# Patient Record
Sex: FEMALE | Race: WHITE
[De-identification: names, ages, dates, MRNs, and addresses within clinical notes are randomized per-mention and may not be internally consistent; named-entity substitution may affect disease eponyms.]

---

## 2018-01-29 ENCOUNTER — HOSPITAL ENCOUNTER (INPATIENT)
Dept: HOSPITAL 96 - M.ERS | Age: 43
LOS: 7 days | Discharge: HOME | DRG: 441 | End: 2018-02-05
Attending: INTERNAL MEDICINE | Admitting: INTERNAL MEDICINE
Payer: COMMERCIAL

## 2018-01-29 VITALS — HEIGHT: 62.99 IN | BODY MASS INDEX: 28.17 KG/M2 | WEIGHT: 159 LBS

## 2018-01-29 VITALS — SYSTOLIC BLOOD PRESSURE: 108 MMHG | DIASTOLIC BLOOD PRESSURE: 62 MMHG

## 2018-01-29 VITALS — DIASTOLIC BLOOD PRESSURE: 92 MMHG | SYSTOLIC BLOOD PRESSURE: 163 MMHG

## 2018-01-29 VITALS — DIASTOLIC BLOOD PRESSURE: 65 MMHG | SYSTOLIC BLOOD PRESSURE: 98 MMHG

## 2018-01-29 VITALS — DIASTOLIC BLOOD PRESSURE: 65 MMHG | SYSTOLIC BLOOD PRESSURE: 112 MMHG

## 2018-01-29 DIAGNOSIS — N39.0: ICD-10-CM

## 2018-01-29 DIAGNOSIS — K72.90: Primary | ICD-10-CM

## 2018-01-29 DIAGNOSIS — E87.6: ICD-10-CM

## 2018-01-29 DIAGNOSIS — R00.0: ICD-10-CM

## 2018-01-29 DIAGNOSIS — E83.42: ICD-10-CM

## 2018-01-29 DIAGNOSIS — Z90.49: ICD-10-CM

## 2018-01-29 DIAGNOSIS — K44.9: ICD-10-CM

## 2018-01-29 DIAGNOSIS — I85.11: ICD-10-CM

## 2018-01-29 DIAGNOSIS — K76.6: ICD-10-CM

## 2018-01-29 DIAGNOSIS — I95.9: ICD-10-CM

## 2018-01-29 DIAGNOSIS — D69.6: ICD-10-CM

## 2018-01-29 DIAGNOSIS — Z79.899: ICD-10-CM

## 2018-01-29 DIAGNOSIS — E44.0: ICD-10-CM

## 2018-01-29 DIAGNOSIS — I10: ICD-10-CM

## 2018-01-29 DIAGNOSIS — K31.89: ICD-10-CM

## 2018-01-29 DIAGNOSIS — E86.0: ICD-10-CM

## 2018-01-29 DIAGNOSIS — K70.30: ICD-10-CM

## 2018-01-29 DIAGNOSIS — I86.4: ICD-10-CM

## 2018-01-29 DIAGNOSIS — F41.9: ICD-10-CM

## 2018-01-29 DIAGNOSIS — R65.10: ICD-10-CM

## 2018-01-29 DIAGNOSIS — K76.9: ICD-10-CM

## 2018-01-29 DIAGNOSIS — F10.20: ICD-10-CM

## 2018-01-29 DIAGNOSIS — D62: ICD-10-CM

## 2018-01-29 LAB
%HYPO/RBC NFR BLD AUTO: (no result) %
ABSOLUTE BASOPHILS: 0.1 THOU/UL (ref 0–0.2)
ABSOLUTE MONOCYTES: 0.9 THOU/UL (ref 0–1.2)
ALBUMIN SERPL-MCNC: 2.3 G/DL (ref 3.4–5)
ALP SERPL-CCNC: 218 U/L (ref 46–116)
ALT SERPL-CCNC: 27 U/L (ref 30–65)
ANION GAP SERPL CALC-SCNC: 11 MMOL/L (ref 7–16)
AST SERPL-CCNC: 135 U/L (ref 15–37)
BACTERIA-REFLEX: (no result) /HPF
BASOPHILS NFR BLD AUTO: 1 %
BILIRUB SERPL-MCNC: 6.5 MG/DL
BILIRUB UR-MCNC: (no result) MG/DL
BUN SERPL-MCNC: 12 MG/DL (ref 7–18)
CALCIUM SERPL-MCNC: 7.5 MG/DL (ref 8.5–10.1)
CHLORIDE SERPL-SCNC: 95 MMOL/L (ref 98–107)
CO2 SERPL-SCNC: 29 MMOL/L (ref 21–32)
COLOR UR: (no result)
CREAT SERPL-MCNC: 0.9 MG/DL (ref 0.6–1.3)
GLUCOSE SERPL-MCNC: 121 MG/DL (ref 70–99)
GRANULOCYTES NFR BLD MANUAL: 81 %
HCT VFR BLD CALC: 24.6 % (ref 37–47)
HCT VFR BLD CALC: 28.6 % (ref 37–47)
HGB BLD-MCNC: 8.7 GM/DL (ref 12–15)
HGB BLD-MCNC: 9.9 GM/DL (ref 12–15)
KETONES UR STRIP-MCNC: (no result) MG/DL
LIPASE: 247 U/L (ref 73–393)
LYMPHOCYTES # BLD: 0.9 THOU/UL (ref 0.8–5.3)
LYMPHOCYTES NFR BLD AUTO: 7 %
MACROCYTES: (no result)
MCH RBC QN AUTO: 33.8 PG (ref 26–34)
MCHC RBC AUTO-ENTMCNC: 34.7 G/DL (ref 28–37)
MCV RBC: 97.4 FL (ref 80–100)
MONOCYTES NFR BLD: 8 %
MPV: 11.2 FL. (ref 7.2–11.1)
NEUTROPHILS # BLD: 9.5 THOU/UL (ref 1.6–8.1)
NEUTS BAND NFR BLD: 2 %
NUCLEATED RBCS: 0 /100WBC
PLATELET # BLD EST: ADEQUATE 10*3/UL
PLATELET COUNT*: 140 THOU/UL (ref 150–400)
POTASSIUM SERPL-SCNC: 2.2 MMOL/L (ref 3.5–5.1)
PROT SERPL-MCNC: 7.5 G/DL (ref 6.4–8.2)
PROT UR QL STRIP: (no result)
RBC # BLD AUTO: 2.94 MIL/UL (ref 4.2–5)
RBC # UR STRIP: (no result) /UL
RDW-CV: 16.2 % (ref 10.5–14.5)
SODIUM SERPL-SCNC: 135 MMOL/L (ref 136–145)
SP GR UR STRIP: 1.01 (ref 1–1.03)
SQUAMOUS: (no result) /LPF (ref 0–3)
TARGETS BLD QL SMEAR: (no result)
URINE CLARITY: (no result)
URINE GLUCOSE-RANDOM: NEGATIVE
URINE LEUKOCYTES-REFLEX: (no result)
URINE NITRITE-REFLEX: POSITIVE
UROBILINOGEN UR STRIP-ACNC: 1 E.U./DL (ref 0.2–1)
VARIANT LYMPHS NFR BLD MANUAL: 1 %
WBC # BLD AUTO: 11.4 THOU/UL (ref 4–11)

## 2018-01-29 NOTE — PROC
[address]  [address]                    PROCEDURE REPORT              
_______________________________________________________________________________
 
Name:       LARA RAO             Room:           [place] [address]    ADM IN  
.R.#:  [id number]      Account #:      [id number]  
Admission:  01/29/18     Attend Phys:    Franko Garcia MD 
Discharge:               Date of Birth:  [birth year]  
         Report #: [id number]
                                                                                
_______________________________________________________________________________
THIS REPORT FOR:  //name//                      
 
For details of GI report, please see the Provation report in Perceptive 7
content.
 
 
 
 
 
 
 
 
 
 
 
 
 
 
 
 
 
 
 
 
 
 
 
 
 
 
 
 
 
 
 
 
 
 
 
 
 
 
 
 
                       
                                        By:                                
                 
D: 01/30/18     _______________________________________
T: 01/31/18 1315Medical Records Staff LUIZ       /AL

## 2018-01-29 NOTE — [place] [address]
Phone:  [phone]                     ELECTROCARDIOGRAM REPORT      
_______________________________________________________________________________
 
Name:       LARA RAO             Room:           [place] [address]    ADM IN  
.R.#:  [id number]      Account #:      [id number]  
Admission:  18     Attend Phys:    Franko Garcia MD 
Discharge:               Date of Birth:  [birth year]  
         Report #: [id number]
    [id number]
_______________________________________________________________________________
THIS REPORT FOR:  //name//                      
 
                         [facility] ED
                                       
Test Date:    2018               Test Time:    11:29:54
Pat Name:     LARA RAO         Department:   
Patient ID:   [id number]            Room:         [facility]
Gender:       F                        Technician:   OSWALDO GILMORE
:          1975               Requested By: Debra Noguera
Order Number: [id number] MD:   Cesar Valverde
                                 Measurements
Intervals                              Axis          
Rate:         126                      P:            16
VT:           129                      QRS:          -6
QRSD:         81                       T:            154
QT:           289                                    
QTc:          419                                    
                           Interpretive Statements
Sinus tachycardia
Multiple ventricular premature complexes
Inferior infarct, old
consider old anterior infarction
Lateral leads are also involved
Compared to ECG 2017 09:58:58
Ventricular premature complex(es) now present
Myocardial infarct finding now present
 
 
Electronically Signed On 2018 16:43:09 CST by Cesar Valverde
[email or web address]
 
 
 
 
 
 
 
 
 
 
 
 
 
  <ELECTRONICALLY SIGNED>
                                           By: Cesar Valverde MD, FACC      
  18     1643
D: 18 1129   _____________________________________
T: 18 1129   Cesar Valverde MD, FAC        /EPI

## 2018-01-29 NOTE — NUR
PT TO ROOM FROM ER , , APPEARS ALERT O X 4, DENIES CHEST PAIN, SOB
PAIN OR DISCOMFORRT, c/o BALCK STOOLSD FROM LAST FEW DAYS, STATES
COUGHED UP SOME BLOOD YESTEDAY. ER REPORSTMG LEVEL 0.6, K LEVLS 2.2

## 2018-01-30 VITALS — DIASTOLIC BLOOD PRESSURE: 70 MMHG | SYSTOLIC BLOOD PRESSURE: 114 MMHG

## 2018-01-30 VITALS — SYSTOLIC BLOOD PRESSURE: 107 MMHG | DIASTOLIC BLOOD PRESSURE: 72 MMHG

## 2018-01-30 VITALS — SYSTOLIC BLOOD PRESSURE: 105 MMHG | DIASTOLIC BLOOD PRESSURE: 71 MMHG

## 2018-01-30 VITALS — DIASTOLIC BLOOD PRESSURE: 64 MMHG | SYSTOLIC BLOOD PRESSURE: 97 MMHG

## 2018-01-30 VITALS — SYSTOLIC BLOOD PRESSURE: 90 MMHG | DIASTOLIC BLOOD PRESSURE: 61 MMHG

## 2018-01-30 VITALS — DIASTOLIC BLOOD PRESSURE: 71 MMHG | SYSTOLIC BLOOD PRESSURE: 105 MMHG

## 2018-01-30 LAB
ABSOLUTE BASOPHILS: 0.1 THOU/UL (ref 0–0.2)
ABSOLUTE EOSINOPHILS: 0.1 THOU/UL (ref 0–0.7)
ABSOLUTE MONOCYTES: 1 THOU/UL (ref 0–1.2)
ANION GAP SERPL CALC-SCNC: 9 MMOL/L (ref 7–16)
BASOPHILS NFR BLD AUTO: 1.5 %
BUN SERPL-MCNC: 10 MG/DL (ref 7–18)
CALCIUM SERPL-MCNC: 6.7 MG/DL (ref 8.5–10.1)
CHLORIDE SERPL-SCNC: 104 MMOL/L (ref 98–107)
CO2 SERPL-SCNC: 24 MMOL/L (ref 21–32)
CREAT SERPL-MCNC: 0.6 MG/DL (ref 0.6–1.3)
EOSINOPHIL NFR BLD: 1.1 %
GLUCOSE SERPL-MCNC: 96 MG/DL (ref 70–99)
GRANULOCYTES NFR BLD MANUAL: 74.8 %
HCT VFR BLD CALC: 24.2 % (ref 37–47)
HGB BLD-MCNC: 8.3 GM/DL (ref 12–15)
INR PPP: 1.6
LYMPHOCYTES # BLD: 0.9 THOU/UL (ref 0.8–5.3)
LYMPHOCYTES NFR BLD AUTO: 10.5 %
MCH RBC QN AUTO: 34.8 PG (ref 26–34)
MCHC RBC AUTO-ENTMCNC: 34.3 G/DL (ref 28–37)
MCV RBC: 101.6 FL (ref 80–100)
MONOCYTES NFR BLD: 12.1 %
MPV: 11 FL. (ref 7.2–11.1)
NEUTROPHILS # BLD: 6.4 THOU/UL (ref 1.6–8.1)
NUCLEATED RBCS: 0 /100WBC
PLATELET COUNT*: 99 THOU/UL (ref 150–400)
POTASSIUM SERPL-SCNC: 3.5 MMOL/L (ref 3.5–5.1)
PROTHROMBIN TIME: 15.3 SECONDS (ref 9.2–11.5)
RBC # BLD AUTO: 2.38 MIL/UL (ref 4.2–5)
RDW-CV: 16.5 % (ref 10.5–14.5)
SODIUM SERPL-SCNC: 137 MMOL/L (ref 136–145)
WBC # BLD AUTO: 8.5 THOU/UL (ref 4–11)

## 2018-01-30 NOTE — NUR
ASSESSMENT COMPLETED REFER TO COMPUTER CHARTING. CARDIAC MONITOR TRACKING ST.
PATIENT RESTING IN BED REPORTING NO PAIN, NAUSEA OR SHORTNESS OF BREATH AT
THIS TIME. IV FLUIDS INFUSING. ON ROOM AIR. PATIENT UP SELF IN ROOM. BED IN
LOW AND LOCKED POSITION. CALL LIGHT WITHIN REACH. WILL CONTINUE TO MONITOR
THIS SHIFT.

## 2018-01-30 NOTE — NUR
MET WITH PT TO DISCUSS HOME SITUATION/DC PLANNING. PT LIVES WITH SPOUSE AND
FAMILY.  HAS SEVERAL FAMILY MEMBERS IN ROOM ALSO WHO ARE SUPPORTIVE. PT STATES
SHE IS INDEPENDENT AND ACTIVE.  USES NO EQUIPMENT AND WORKS OUTSIDE THE HOME.
PT TO HAVE GI PROCEDURES TODAY.  DENIES ANY DC NEEDS AT THIS TIME. WILL FOLLOW

## 2018-01-30 NOTE — NUR
PT ALERT ORIENTED. INITAL ASSESSMENT HR 130S. DR MARIEE NOTIFIED NS TIMES
ONE LITER BOLUS GIVEN. MAG 0.6 MAG REPLACEMENT GIVEN. REDRAW CAME BACK AS 3.6.
VITAMIN BAG AT 100ML/HR HELD. DR MARIEE NOTIFIED. NS AT 100ML/HR TIMES ONE
LITER ORDERED. ALSO ORDER TO REMOVE MG FROM VITAMIN BAG. TELEMETRY NOW SHOWING
. PT UP TO BATHROOM TWICE. NPO AT MN. ORAL K INITALLY GIVEN. PT HAD ONE
EPISOID OF BLOODY EMESIS. SM AMT. NOW DOING IV REPLACEMENT. PROTONIX AT
20MG/HR. STAT H&H ORDERED RESULTS 8.7/24.6. WILL CONTINUE TO MONITOR.

## 2018-01-31 VITALS — DIASTOLIC BLOOD PRESSURE: 70 MMHG | SYSTOLIC BLOOD PRESSURE: 103 MMHG

## 2018-01-31 VITALS — SYSTOLIC BLOOD PRESSURE: 105 MMHG | DIASTOLIC BLOOD PRESSURE: 69 MMHG

## 2018-01-31 VITALS — DIASTOLIC BLOOD PRESSURE: 63 MMHG | SYSTOLIC BLOOD PRESSURE: 99 MMHG

## 2018-01-31 VITALS — SYSTOLIC BLOOD PRESSURE: 114 MMHG | DIASTOLIC BLOOD PRESSURE: 74 MMHG

## 2018-01-31 VITALS — SYSTOLIC BLOOD PRESSURE: 96 MMHG | DIASTOLIC BLOOD PRESSURE: 59 MMHG

## 2018-01-31 LAB
ABSOLUTE BASOPHILS: 0.1 THOU/UL (ref 0–0.2)
ABSOLUTE EOSINOPHILS: 0.1 THOU/UL (ref 0–0.7)
ABSOLUTE MONOCYTES: 0.8 THOU/UL (ref 0–1.2)
ALBUMIN SERPL-MCNC: 1.9 G/DL (ref 3.4–5)
ALP SERPL-CCNC: 176 U/L (ref 46–116)
ALT SERPL-CCNC: 36 U/L (ref 30–65)
ANION GAP SERPL CALC-SCNC: 9 MMOL/L (ref 7–16)
APTT BLD: 31.1 SECONDS (ref 25–31.3)
AST SERPL-CCNC: 147 U/L (ref 15–37)
BASOPHILS NFR BLD AUTO: 1 %
BILIRUB SERPL-MCNC: 5.6 MG/DL
BUN SERPL-MCNC: 7 MG/DL (ref 7–18)
CALCIUM SERPL-MCNC: 6.1 MG/DL (ref 8.5–10.1)
CHLORIDE SERPL-SCNC: 103 MMOL/L (ref 98–107)
CO2 SERPL-SCNC: 22 MMOL/L (ref 21–32)
CREAT SERPL-MCNC: 0.6 MG/DL (ref 0.6–1.3)
EOSINOPHIL NFR BLD: 1.1 %
GLUCOSE SERPL-MCNC: 91 MG/DL (ref 70–99)
GRANULOCYTES NFR BLD MANUAL: 82.6 %
HCT VFR BLD CALC: 22 % (ref 37–47)
HCT VFR BLD CALC: 29 % (ref 37–47)
HGB BLD-MCNC: 7.6 GM/DL (ref 12–15)
HGB BLD-MCNC: 9.8 GM/DL (ref 12–15)
INR PPP: 1.6
LYMPHOCYTES # BLD: 0.6 THOU/UL (ref 0.8–5.3)
LYMPHOCYTES NFR BLD AUTO: 6.7 %
MCH RBC QN AUTO: 34.9 PG (ref 26–34)
MCHC RBC AUTO-ENTMCNC: 34.5 G/DL (ref 28–37)
MCV RBC: 101.2 FL (ref 80–100)
MONOCYTES NFR BLD: 8.6 %
MPV: 11.7 FL. (ref 7.2–11.1)
NEUTROPHILS # BLD: 7.5 THOU/UL (ref 1.6–8.1)
NUCLEATED RBCS: 0 /100WBC
PLATELET COUNT*: 107 THOU/UL (ref 150–400)
POTASSIUM SERPL-SCNC: 3.5 MMOL/L (ref 3.5–5.1)
PROT SERPL-MCNC: 6.1 G/DL (ref 6.4–8.2)
PROTHROMBIN TIME: 15.8 SECONDS (ref 9.2–11.5)
RBC # BLD AUTO: 2.17 MIL/UL (ref 4.2–5)
RDW-CV: 16.7 % (ref 10.5–14.5)
SODIUM SERPL-SCNC: 134 MMOL/L (ref 136–145)
WBC # BLD AUTO: 9.1 THOU/UL (ref 4–11)

## 2018-01-31 PROCEDURE — 0FB03ZX EXCISION OF LIVER, PERCUTANEOUS APPROACH, DIAGNOSTIC: ICD-10-PCS | Performed by: RADIOLOGY

## 2018-01-31 PROCEDURE — 30233N1 TRANSFUSION OF NONAUTOLOGOUS RED BLOOD CELLS INTO PERIPHERAL VEIN, PERCUTANEOUS APPROACH: ICD-10-PCS | Performed by: INTERNAL MEDICINE

## 2018-01-31 PROCEDURE — 0DJ08ZZ INSPECTION OF UPPER INTESTINAL TRACT, VIA NATURAL OR ARTIFICIAL OPENING ENDOSCOPIC: ICD-10-PCS | Performed by: INTERNAL MEDICINE

## 2018-01-31 NOTE — CON
[address]  [address]                    CONSULTATION                  
_______________________________________________________________________________
 
Name:       LARA RAO             Room:           [place] [address]    ADM IN  
M.R.#:  [id number]      Account #:      [id number]  
Admission:  01/29/18     Attend Phys:    Franko Garcia MD 
Discharge:               Date of Birth:  [birth year]  
         Report #: [id number]
                                                                     [id number]  
_______________________________________________________________________________
THIS REPORT FOR:  //name//                      
 
CC: Franko Patel DO
 
DICTATED BY: Kerry Padilla [facility]
 
DATE OF SERVICE:  01/30/2018
 
 
Please note at the time of this dictation, the patient was seen and physically
examined by myself.
 
REASON FOR CONSULTATION:  Elevated melanotic stool and hematemesis.
 
HISTORY OF PRESENT ILLNESS:  This is a 42-year-old female who has been
complaining of upper respiratory issues for the last 2 weeks including cough. 
She went to Urgent Care last week and she states she had blood work done and
they told her at that time that her blood work was all normal.  I do not have
any labs to validate this.  She states on Saturday, she had a little bit of dark
stools.  Her stools, she states, on Saturday and Sunday have been diarrhea to
loose a couple of times a day and have been black to dark in nature.  She denies
taking any iron.  She also started on yesterday or Sunday evening having
vomiting some bright red blood, which was concerning since she was already
having some dark stools.  The patient states she has not had anything to drink
as of a couple of weeks ago and she states at that time she had 3-4 hard liquor
type drinks.  She also noticed that she had been having some nosebleeds as well.
 The patient was last seen by us in 12/2016 and underwent an EGD, which showed
grade C esophagitis, hiatal hernia, gastritis, negative for H. pylori and
duodenal biopsies that were negative as well.  She was to have a repeat
endoscopy study; however, she states when she came in, they told her that her
pregnancy test was positive and her procedure was canceled and she never got
that done last year.  She is no longer taking any of her PPIs at this time.  She
denies any ibuprofen or any nonsteroidals at this time as well.  She denies any
abdominal pain or any ongoing nausea at this time.
 
ALLERGIES:  No known drug allergies.
 
MEDICATIONS:  From home include Cipro and Zofran along with lisinopril.
 
PAST MEDICAL HISTORY:  Hypertension, history of alcoholic hepatitis in the past,
some anxiety.
 
PAST SURGICAL HISTORY:  Appendectomy, tonsillectomy, breast augmentation.
 
 
 
 
[place] [address]                    CONSULTATION                  
_______________________________________________________________________________
 
Name:       LARA RAO TEN             Room:           [address] [place]  
[facility]#:  [id number]      Account #:      [id number]  
Admission:  01/29/18     Attend Phys:    Franko Garcia MD 
Discharge:               Date of Birth:  [birth year]  
         Report #: [id number]
                                                                     [id number]  
_______________________________________________________________________________
FAMILY HISTORY:  Noncontributory.
 
SOCIAL HISTORY:  Tobacco use, none.  No illegal drug use.  Alcohol use, she
continues at least on a weekly basis, but has not had any in the last couple of
weeks because of her upper respiratory symptoms.  A 12-point review of systems
is essentially negative except what is mentioned in the HPI.
 
PHYSICAL EXAMINATION:
VITAL SIGNS:  Temperature 36.9, pulse 106, respirations 20, blood pressure
97/64.
HEART:  Regular rate and rhythm.
LUNGS:  Clear.
ABDOMEN:  Soft, positive bowel sounds in all 4 quadrants with no masses or
tenderness noted.
 
LABORATORY DATA:  Hemoglobin on admission was 9.9; back in 09/2017, she was
noted to be 13.9; hemoglobin this morning is 8.3.  Hematocrit 24.2.  White count
on admission was 11.4, she is down to 8.5.  Platelets were 140 and she is down
to 99.  Sodium 137, potassium 3.5, chloride 104, CO2 24, BUN is 10, creatinine
is 0.6, GFR is 109, glucose is 96.  She did have some positive nitrites in her
urine and potassium and magnesium were low on admission and have been since
corrected.  _____, alk phos 218, ALT 27, , _____ and albumin is 2.3.  CT
of the abdomen and pelvis showed hepatomegaly with extensive, tiny, well-defined
liver lesions scattered throughout.  She had a thickened stricture at the tip of
the proximal colon, which may represent a contracted cecum with the appendix
still in place and her gallbladder is present.  Also, noted some steatosis and
bile ducts are normal.
NEUROLOGIC:  Spleen is normal with some multiple calcified granulomas present.
 
IMPRESSION:
1.  Hematemesis.
2.  Melanotic stool.
3.  Acute anemia.
4.  Elevated liver function tests.
5.  Liver lesions.
6.  History of grade C esophagitis and gastritis.
7.  History of alcohol abuse.
8.  Thrombocytopenia.
 
PLAN:
1.  EGD today with Dr. Oleary.
2.  Liver biopsy at 1300.
3.  AFP, PT and INR today.  CMP and CBC in a.m.
4.  Further recommendations to be made once the above have all been noted.
 
Thank you for allowing us to participate in this patient's care.  Please do not
 
 
 
[place] [address]                    CONSULTATION                  
_______________________________________________________________________________
 
Name:       LARA RAO             Room:           227[address]1    [facility] [place]  
.TEN.#:  [id number]      Account #:      [id number]  
Admission:  01/29/18     Attend Phys:    Franko Garcia MD 
Discharge:               Date of Birth:  [birth year]  
         Report #: [id number]
                                                                     [id number]  
_______________________________________________________________________________
hesitate to call with any questions in regard to this consult.
 
The patient with history of alcohol abuse and grade C esophagitis back in
12/2016, who reports that she takes Prevacid on as-needed basis.  She reports
that she had nausea, vomiting, hematemesis and also melanotic stool.  Her stool
today has been clearing and turning brown.  She denies any further nausea and
vomiting.  She also denies any abdominal pain.  She appeared jaundiced and her
bilirubin is above 6.  She is scheduled for liver biopsy later.  We will go
ahead and perform an upper endoscopy to further evaluate her GI bleed.  We will
postpone her liver biopsy to tomorrow.
 
 
 
 
 
 
 
 
 
 
 
 
 
 
 
 
 
 
 
 
 
 
 
 
 
 
 
 
 
 
 
 
 
 
<ELECTRONICALLY SIGNED>
                                        By:  César Oleary MD            
01/31/18     1613
D: 01/30/18 1132_______________________________________
T: 01/31/18 0105César Oleary MD               /nt

## 2018-01-31 NOTE — NUR
ASSESSMENT COMPLETED REFER TO COMPUTER CHARTING. CARDIAC MONITOR TRACKING ST.
PATIENT RESTING IN BED REPORTING NO PAIN, NAUSEA OR SHORTNESS OF BREATH. BED
IN LOW AND LOCKED POSITION. CALL LIGHT WITHIN REACH. IV FLUIDS INFUSING. ON
ROOM AIR. PATIENT UP SELF IN ROOM. WILL CONTINUE TO MONITOR.

## 2018-01-31 NOTE — CON
[address]  [address]                    CONSULTATION                  
_______________________________________________________________________________
 
Name:       LARA RAO             Room:           [address] [place]  
M.R.#:  [id number]      Account #:      [id number]  
Admission:  01/29/18     Attend Phys:    Franko Garcia MD 
Discharge:               Date of Birth:  [birth year]  
         Report #: [id number]
                                                                     [id number]  
_______________________________________________________________________________
THIS REPORT FOR:  //name//                      
 
CC: Franko Patel
 
DATE OF SERVICE:  01/30/2018
 
 
ADDENDUM
 
The patient with history of alcohol abuse and grade C esophagitis back in
12/2016, who reports that she takes Prevacid on as-needed basis.  She reports
that she had nausea, vomiting, hematemesis and also melanotic stool.  Her stool
today has been clearing and turning brown.  She denies any further nausea and
vomiting.  She also denies any abdominal pain.  She appeared jaundiced and her
bilirubin is above 6.  She is scheduled for liver biopsy later.  We will go
ahead and perform an upper endoscopy to further evaluate her GI bleed.  We will
postpone her liver biopsy to tomorrow.
 
 
 
 
 
 
 
 
 
 
 
 
 
 
 
 
 
 
 
 
 
 
 
 
 
 
<ELECTRONICALLY SIGNED>
                                        By:  César Oleary MD            
01/31/18     1613
D: 01/30/18 1510_______________________________________
T: 01/30/18 2316César Oleary MD               /nt

## 2018-01-31 NOTE — NUR
ASSUMED PT CARE AT 1930, PT IS A&OX4, PT IS TRACING ST ON THE MONITOR, ON RA
SATTING MID TO HIGH 90'S. PT IS NPO SINCE MIDNIGHT FOR A LIVER BIOPSY TODAY.
PT DENIES ANY PAIN OR NEEDS THIS SHIFT, PT HAS HAD SEVERAL LOOSE STOOLS
NEEDING BED CHANGES THIS SHIFT. IVF INFUSING PER MAR. BED IN LOW POSITION,
CALL LIGHT IN REACH, PT IS UP AD GARRICK IN HER ROOM AND STABLE ON HER FEET.
HOURLY ROUNDING COMPLETED FOR PT SAFETY.

## 2018-02-01 VITALS — SYSTOLIC BLOOD PRESSURE: 121 MMHG | DIASTOLIC BLOOD PRESSURE: 87 MMHG

## 2018-02-01 VITALS — SYSTOLIC BLOOD PRESSURE: 115 MMHG | DIASTOLIC BLOOD PRESSURE: 74 MMHG

## 2018-02-01 VITALS — DIASTOLIC BLOOD PRESSURE: 61 MMHG | SYSTOLIC BLOOD PRESSURE: 129 MMHG

## 2018-02-01 VITALS — DIASTOLIC BLOOD PRESSURE: 65 MMHG | SYSTOLIC BLOOD PRESSURE: 104 MMHG

## 2018-02-01 VITALS — SYSTOLIC BLOOD PRESSURE: 123 MMHG | DIASTOLIC BLOOD PRESSURE: 83 MMHG

## 2018-02-01 VITALS — DIASTOLIC BLOOD PRESSURE: 69 MMHG | SYSTOLIC BLOOD PRESSURE: 134 MMHG

## 2018-02-01 VITALS — SYSTOLIC BLOOD PRESSURE: 130 MMHG | DIASTOLIC BLOOD PRESSURE: 86 MMHG

## 2018-02-01 VITALS — SYSTOLIC BLOOD PRESSURE: 127 MMHG | DIASTOLIC BLOOD PRESSURE: 84 MMHG

## 2018-02-01 VITALS — DIASTOLIC BLOOD PRESSURE: 83 MMHG | SYSTOLIC BLOOD PRESSURE: 130 MMHG

## 2018-02-01 LAB
ABSOLUTE BASOPHILS: 0.1 THOU/UL (ref 0–0.2)
ABSOLUTE EOSINOPHILS: 0.1 THOU/UL (ref 0–0.7)
ABSOLUTE MONOCYTES: 0.7 THOU/UL (ref 0–1.2)
ALBUMIN SERPL-MCNC: 2 G/DL (ref 3.4–5)
ALP SERPL-CCNC: 198 U/L (ref 46–116)
ALT SERPL-CCNC: 39 U/L (ref 30–65)
ANION GAP SERPL CALC-SCNC: 10 MMOL/L (ref 7–16)
ANION GAP SERPL CALC-SCNC: 7 MMOL/L (ref 7–16)
AST SERPL-CCNC: 151 U/L (ref 15–37)
BASOPHILS NFR BLD AUTO: 1.1 %
BE: -5.7 MMOL/L
BILIRUB SERPL-MCNC: 5.9 MG/DL
BUN SERPL-MCNC: 4 MG/DL (ref 7–18)
BUN SERPL-MCNC: 5 MG/DL (ref 7–18)
CALCIUM SERPL-MCNC: 5.9 MG/DL (ref 8.5–10.1)
CALCIUM SERPL-MCNC: 6.1 MG/DL (ref 8.5–10.1)
CHLORIDE SERPL-SCNC: 103 MMOL/L (ref 98–107)
CHLORIDE SERPL-SCNC: 105 MMOL/L (ref 98–107)
CO2 SERPL-SCNC: 21 MMOL/L (ref 21–32)
CO2 SERPL-SCNC: 22 MMOL/L (ref 21–32)
CREAT SERPL-MCNC: 0.6 MG/DL (ref 0.6–1.3)
CREAT SERPL-MCNC: 0.6 MG/DL (ref 0.6–1.3)
EOSINOPHIL NFR BLD: 0.7 %
GLUCOSE SERPL-MCNC: 102 MG/DL (ref 70–99)
GLUCOSE SERPL-MCNC: 94 MG/DL (ref 70–99)
GRANULOCYTES NFR BLD MANUAL: 87.1 %
HCO3 BLD-SCNC: 15.5 MMOL/L (ref 22–26)
HCT VFR BLD CALC: 27.4 % (ref 37–47)
HGB BLD-MCNC: 9.5 GM/DL (ref 12–15)
INR PPP: 1.7
LYMPHOCYTES # BLD: 0.5 THOU/UL (ref 0.8–5.3)
LYMPHOCYTES NFR BLD AUTO: 4.7 %
MCH RBC QN AUTO: 33.6 PG (ref 26–34)
MCHC RBC AUTO-ENTMCNC: 34.7 G/DL (ref 28–37)
MCV RBC: 96.9 FL (ref 80–100)
MONOCYTES NFR BLD: 6.4 %
MPV: 11.7 FL. (ref 7.2–11.1)
NEUTROPHILS # BLD: 9.3 THOU/UL (ref 1.6–8.1)
NT-PRO BRAIN NAT PEPTIDE: 1178 PG/ML (ref ?–300)
NUCLEATED RBCS: 0 /100WBC
PCO2 BLD: 19.5 MMHG (ref 35–45)
PLATELET COUNT*: 112 THOU/UL (ref 150–400)
PO2 BLD: 69 MMHG (ref 75–100)
POTASSIUM SERPL-SCNC: 2.9 MMOL/L (ref 3.5–5.1)
POTASSIUM SERPL-SCNC: 4.3 MMOL/L (ref 3.5–5.1)
PREALB SERPL-MCNC: 7.7 MG/DL (ref 18–35.7)
PROT SERPL-MCNC: 6.5 G/DL (ref 6.4–8.2)
PROTHROMBIN TIME: 16 SECONDS (ref 9.2–11.5)
RBC # BLD AUTO: 2.83 MIL/UL (ref 4.2–5)
RDW-CV: 21 % (ref 10.5–14.5)
SODIUM SERPL-SCNC: 132 MMOL/L (ref 136–145)
SODIUM SERPL-SCNC: 136 MMOL/L (ref 136–145)
WBC # BLD AUTO: 10.7 THOU/UL (ref 4–11)

## 2018-02-01 NOTE — NUR
PATIENT RESTING QUIETLY IN BED AT THIS TIME. STATING SHE IS FEELING BETTER.
VIALS 130/86 RR 32  TEMP 98.5. CONTACTED ANSWERING SERVICE TO UPDATE
PATIENT CONDITION PER PHYSICIAN REQUEST, NOT ABLE TO BE REACHED AT THIS TIME.
CONTINUE TO MONITOR.

## 2018-02-01 NOTE — NUR
ASSUMED CARE OF PATIENT AT 1115 AFTER TRANSFER TO ROOM 107 FROM TELEMETRY
UNIT.  PATIENT AWAKE, ALERT, AND ORIENTED APPROPRIATELY.  POTASSIUM BEING
REPLACED IV.  WILL CONTINUE ELECTROLYTE PROTOCOL.  DENIES NEEDS AT THIS TIME.
NURSING WILL CONTINUE TO MONITOR.

## 2018-02-01 NOTE — NUR
BEDSIDE, CHANGE OF SHIFT REPOT GIVEN
ASSUMED PATIENT CARE
PATIENT SEEN AT BEDSIDE
IN BED AND WATCHING TV

## 2018-02-01 NOTE — NUR
ASSUMED PATIENT CARE AT 1930. PATIENT SOA. RR 40. LUNGS CLEAR /84 
TEMP 99.5. PHYSICIAN CONTACTED, ORDERS RECEIVED FOR STAT CXRAY AND LABS.

## 2018-02-01 NOTE — NUR
ALERT AND ORIENTED X 4. NPO IN AM FOR LIVER BIOPSY. DENIES PAIN OR DISCOMFORT.
CONT. TO BE UP AD GARRICK. NO SIGN OF DISTRESS. WILL PROCEED WITH CURRENT PLAN OF
CARE AT THIS TIME.

## 2018-02-01 NOTE — NUR
NO CHANGE IN PATIENT STATUS.  REPLACING POTASSIUM IV PER ELECTROLYTE PROTOCOL.
 DENIES NEEDS AT THIS TIME.  NO PAIN TO COMPLAIN OF.  NURSING WILL CONTINUE TO
MONITOR.

## 2018-02-02 VITALS — DIASTOLIC BLOOD PRESSURE: 93 MMHG | SYSTOLIC BLOOD PRESSURE: 126 MMHG

## 2018-02-02 VITALS — DIASTOLIC BLOOD PRESSURE: 86 MMHG | SYSTOLIC BLOOD PRESSURE: 126 MMHG

## 2018-02-02 VITALS — DIASTOLIC BLOOD PRESSURE: 66 MMHG | SYSTOLIC BLOOD PRESSURE: 101 MMHG

## 2018-02-02 LAB
ABSOLUTE EOSINOPHILS: 0.1 THOU/UL (ref 0–0.7)
ABSOLUTE MONOCYTES: 0.5 THOU/UL (ref 0–1.2)
ALBUMIN SERPL-MCNC: 1.8 G/DL (ref 3.4–5)
ALP SERPL-CCNC: 183 U/L (ref 46–116)
ALT SERPL-CCNC: 40 U/L (ref 30–65)
ANION GAP SERPL CALC-SCNC: 8 MMOL/L (ref 7–16)
ANISOCYTOSIS BLD QL SMEAR: (no result)
AST SERPL-CCNC: 137 U/L (ref 15–37)
BILIRUB SERPL-MCNC: 6.1 MG/DL
BUN SERPL-MCNC: 4 MG/DL (ref 7–18)
CALCIUM SERPL-MCNC: 5.8 MG/DL (ref 8.5–10.1)
CHLORIDE SERPL-SCNC: 104 MMOL/L (ref 98–107)
CO2 SERPL-SCNC: 23 MMOL/L (ref 21–32)
CREAT SERPL-MCNC: 0.6 MG/DL (ref 0.6–1.3)
EOSINOPHIL NFR BLD: 1 %
GLUCOSE SERPL-MCNC: 97 MG/DL (ref 70–99)
GRANULOCYTES NFR BLD MANUAL: 95 %
HCT VFR BLD CALC: 26.5 % (ref 37–47)
HGB BLD-MCNC: 9.1 GM/DL (ref 12–15)
MCH RBC QN AUTO: 33.5 PG (ref 26–34)
MCHC RBC AUTO-ENTMCNC: 34.3 G/DL (ref 28–37)
MCV RBC: 97.6 FL (ref 80–100)
MONOCYTES NFR BLD: 4 %
MPV: 11.6 FL. (ref 7.2–11.1)
NEUTROPHILS # BLD: 10.8 THOU/UL (ref 1.6–8.1)
NUCLEATED RBCS: 0 /100WBC
PLATELET # BLD EST: ADEQUATE 10*3/UL
PLATELET COUNT*: 116 THOU/UL (ref 150–400)
POTASSIUM SERPL-SCNC: 3.7 MMOL/L (ref 3.5–5.1)
PROT SERPL-MCNC: 6 G/DL (ref 6.4–8.2)
RBC # BLD AUTO: 2.71 MIL/UL (ref 4.2–5)
RDW-CV: 20.8 % (ref 10.5–14.5)
SODIUM SERPL-SCNC: 135 MMOL/L (ref 136–145)
WBC # BLD AUTO: 11.4 THOU/UL (ref 4–11)

## 2018-02-02 NOTE — NUR
PATIENT RESTING QUIETLY AT THIS TIME. RR 25-30. VITALS STABLE. SOA ON
EXERTION. O2 SATS 98% ON 2L NC. NON PRODUCTIVE COUGH, STATING LEFT SIDE RIB
PAIN WHEN COUGHING. UP AD GARRICK TO BATHROOM.  VOIDING ADEQUATELY. IVF DC'D PER
ORDERS. WILL CONTINUE TO MONITOR.

## 2018-02-02 NOTE — NUR
PATIENT REMAINED ALERT AND ORIENTED X'S 4. VITAL SIGNS AND SPO2 STABLE. IV
CLEAN, FLUSHING FLUIDS. PATIENT HAS DENIED PAIN THROUGHOUT SHIFT. TOLERATED
DIET, NO NAUSEA AND VOMITING. PASSED BM, VOIDED WITHOUT ISSUE. TOLERATED
CALCIUM REPLACEMENT. SKIN LOOKS GOOD. COMPLETED HOURLY ROUNDING. CALL LIGHT
WITHIN REACH. WILL COTNINUE TO MONITOR.

## 2018-02-03 VITALS — DIASTOLIC BLOOD PRESSURE: 58 MMHG | SYSTOLIC BLOOD PRESSURE: 90 MMHG

## 2018-02-03 VITALS — DIASTOLIC BLOOD PRESSURE: 67 MMHG | SYSTOLIC BLOOD PRESSURE: 100 MMHG

## 2018-02-03 VITALS — DIASTOLIC BLOOD PRESSURE: 63 MMHG | SYSTOLIC BLOOD PRESSURE: 99 MMHG

## 2018-02-03 VITALS — DIASTOLIC BLOOD PRESSURE: 66 MMHG | SYSTOLIC BLOOD PRESSURE: 104 MMHG

## 2018-02-03 LAB
ABSOLUTE BASOPHILS: 0.1 THOU/UL (ref 0–0.2)
ABSOLUTE EOSINOPHILS: 0.2 THOU/UL (ref 0–0.7)
ABSOLUTE MONOCYTES: 1 THOU/UL (ref 0–1.2)
ALBUMIN SERPL-MCNC: 1.8 G/DL (ref 3.4–5)
ALP SERPL-CCNC: 199 U/L (ref 46–116)
ALT SERPL-CCNC: 43 U/L (ref 30–65)
ANION GAP SERPL CALC-SCNC: 9 MMOL/L (ref 7–16)
AST SERPL-CCNC: 144 U/L (ref 15–37)
BASOPHILS NFR BLD AUTO: 0.6 %
BILIRUB SERPL-MCNC: 6.1 MG/DL
BUN SERPL-MCNC: 10 MG/DL (ref 7–18)
CALCIUM SERPL-MCNC: 6.3 MG/DL (ref 8.5–10.1)
CHLORIDE SERPL-SCNC: 103 MMOL/L (ref 98–107)
CO2 SERPL-SCNC: 22 MMOL/L (ref 21–32)
CREAT SERPL-MCNC: 0.6 MG/DL (ref 0.6–1.3)
EOSINOPHIL NFR BLD: 1.6 %
GLUCOSE SERPL-MCNC: 100 MG/DL (ref 70–99)
GRANULOCYTES NFR BLD MANUAL: 85.3 %
HCT VFR BLD CALC: 27.6 % (ref 37–47)
HGB BLD-MCNC: 9.5 GM/DL (ref 12–15)
INR PPP: 1.8
IRON SERPL-MCNC: 52 UG/DL (ref 50–175)
LYMPHOCYTES # BLD: 1 THOU/UL (ref 0.8–5.3)
LYMPHOCYTES NFR BLD AUTO: 6.3 %
MCH RBC QN AUTO: 33.7 PG (ref 26–34)
MCHC RBC AUTO-ENTMCNC: 34.5 G/DL (ref 28–37)
MCV RBC: 97.6 FL (ref 80–100)
MONOCYTES NFR BLD: 6.2 %
MPV: 12 FL. (ref 7.2–11.1)
NEUTROPHILS # BLD: 13 THOU/UL (ref 1.6–8.1)
NUCLEATED RBCS: 0 /100WBC
PLATELET COUNT*: 164 THOU/UL (ref 150–400)
POTASSIUM SERPL-SCNC: 3.8 MMOL/L (ref 3.5–5.1)
PROT SERPL-MCNC: 6.2 G/DL (ref 6.4–8.2)
PROTHROMBIN TIME: 17.7 SECONDS (ref 9.2–11.5)
RBC # BLD AUTO: 2.83 MIL/UL (ref 4.2–5)
RDW-CV: 20.9 % (ref 10.5–14.5)
SAO2 % BLD FROM PO2: 51 % (ref 20–39)
SODIUM SERPL-SCNC: 134 MMOL/L (ref 136–145)
WBC # BLD AUTO: 15.3 THOU/UL (ref 4–11)

## 2018-02-03 NOTE — NUR
ALERT AND ORIENTED X4.  UP AD GARRICK TO BATHROOM.  DENIES PAIN OR NAUSEA.  HAS
NONPRODUCTIVE COUGH.  ON O2 AT 2L/NC TO KEEP O2 SATS IN 90'S.  SKIN
REMAINS JAUNDICE IN COLOR. NEW IV STARTED WITHOUT DIFFICULTY RIGHT ARM.  CALL
LIGHT WITHIN REACH.

## 2018-02-03 NOTE — NUR
ASSUMED CARE OF PATIENT AFTER MORNING REPORT. ALERT AND ORIENTED X4.
ASSESSMENT COMPLETED AND AS CHARTED. VSS ON ROOM AIR. PATIENT HAS HAD NO
COMPLAINTS OF PAIN OR NAUSEA THIS SHIFT. ANTIBIOTICS INFUSED AS ORDERED. XRAY
COMPLETED AS ORDERED. PATIENTS STATUS HAS CHANGED TO TELEMETRY, AWAITING
ROOM TO TRANSFER AT THIS TIME. HOURLY ROUNDS HAVE BEEN MAINTAINED. CALL LIGHT
IS WITHIN REACH. NURSING WILL CONTINUE TO MONITOR.

## 2018-02-04 VITALS — SYSTOLIC BLOOD PRESSURE: 88 MMHG | DIASTOLIC BLOOD PRESSURE: 55 MMHG

## 2018-02-04 VITALS — SYSTOLIC BLOOD PRESSURE: 80 MMHG | DIASTOLIC BLOOD PRESSURE: 52 MMHG

## 2018-02-04 VITALS — SYSTOLIC BLOOD PRESSURE: 98 MMHG | DIASTOLIC BLOOD PRESSURE: 55 MMHG

## 2018-02-04 VITALS — DIASTOLIC BLOOD PRESSURE: 50 MMHG | SYSTOLIC BLOOD PRESSURE: 82 MMHG

## 2018-02-04 VITALS — DIASTOLIC BLOOD PRESSURE: 57 MMHG | SYSTOLIC BLOOD PRESSURE: 93 MMHG

## 2018-02-04 VITALS — DIASTOLIC BLOOD PRESSURE: 52 MMHG | SYSTOLIC BLOOD PRESSURE: 101 MMHG

## 2018-02-04 VITALS — SYSTOLIC BLOOD PRESSURE: 104 MMHG | DIASTOLIC BLOOD PRESSURE: 63 MMHG

## 2018-02-04 VITALS — SYSTOLIC BLOOD PRESSURE: 73 MMHG | DIASTOLIC BLOOD PRESSURE: 44 MMHG

## 2018-02-04 VITALS — DIASTOLIC BLOOD PRESSURE: 55 MMHG | SYSTOLIC BLOOD PRESSURE: 88 MMHG

## 2018-02-04 LAB
ALBUMIN SERPL-MCNC: 1.6 G/DL (ref 3.4–5)
ALP SERPL-CCNC: 183 U/L (ref 46–116)
ALT SERPL-CCNC: 40 U/L (ref 30–65)
ANION GAP SERPL CALC-SCNC: 10 MMOL/L (ref 7–16)
AST SERPL-CCNC: 148 U/L (ref 15–37)
BILIRUB SERPL-MCNC: 5.8 MG/DL
BUN SERPL-MCNC: 11 MG/DL (ref 7–18)
CALCIUM SERPL-MCNC: 6.3 MG/DL (ref 8.5–10.1)
CANCER AG125 SERPL-ACNC: 209.1 U/ML (ref 0–38.1)
CHLORIDE SERPL-SCNC: 101 MMOL/L (ref 98–107)
CO2 SERPL-SCNC: 21 MMOL/L (ref 21–32)
CREAT SERPL-MCNC: 0.8 MG/DL (ref 0.6–1.3)
GLUCOSE SERPL-MCNC: 95 MG/DL (ref 70–99)
HCT VFR BLD CALC: 25.4 % (ref 37–47)
HGB BLD-MCNC: 8.7 GM/DL (ref 12–15)
MAGNESIUM SERPL-MCNC: 1.3 MG/DL (ref 1.8–2.4)
MCH RBC QN AUTO: 33.7 PG (ref 26–34)
MCHC RBC AUTO-ENTMCNC: 34.3 G/DL (ref 28–37)
MCV RBC: 98.2 FL (ref 80–100)
MPV: 11.9 FL. (ref 7.2–11.1)
PLATELET COUNT*: 146 THOU/UL (ref 150–400)
POTASSIUM SERPL-SCNC: 3.6 MMOL/L (ref 3.5–5.1)
PROT SERPL-MCNC: 5.6 G/DL (ref 6.4–8.2)
RBC # BLD AUTO: 2.58 MIL/UL (ref 4.2–5)
RDW-CV: 21.4 % (ref 10.5–14.5)
SODIUM SERPL-SCNC: 132 MMOL/L (ref 136–145)
WBC # BLD AUTO: 12.6 THOU/UL (ref 4–11)

## 2018-02-04 NOTE — NUR
PT UP AD GARRICK, SLEPT AT INTERVALS DURING THE NIGHT, IV FLUID BOLUS INFUSED,
PLEASANT, CALL LIGHT IN REACH, WILL CONTINUE TO MONITOR

## 2018-02-04 NOTE — NUR
PATIENT RESTING IN BED. PATIENT IS UP AD GARRICK IN ROOM. PATIENT DENIES ANY PAIN.
PATIENT IS JUANDICED. PATIENT HAS GOOD APPETITE. PATIENT BLOOD PRESSURE HAS
BEEN RUNNING LOW AT 80'S/40'S, DR PETERS NOTIFIED AND PATIENT IS
ASYMPTOMATIC. PATIENT DENIES ANY NEEDS AT THIS TIME. CALL LIGHT WITHIN REACH.
WILL CONTINUE TO MONITOR.

## 2018-02-04 NOTE — EKG
[place] [address]
Phone:  [phone]                     ELECTROCARDIOGRAM REPORT      
_______________________________________________________________________________
 
Name:       LARA RAO             Room:           [address]    ADM IN  
M.R.#:  [id number]      Account #:      [id number]  
Admission:  18     Attend Phys:    Franko Garcia MD 
Discharge:               Date of Birth:  [birth year]  
         Report #: [id number]
    [id number]
_______________________________________________________________________________
THIS REPORT FOR:  //name//                      
 
                          [facility]
                                       
Test Date:    2018               Test Time:    08:48:32
Pat Name:     LARA RAO         Department:   
Patient ID:   [id number]            Room:         [address]
Gender:       F                        Technician:   27
:          1975               Requested By: Goran Alba
Order Number: [id number]    Reading MD:   Kaiden Ulloa
                                 Measurements
Intervals                              Axis          
Rate:         92                       P:            13
SD:           128                      QRS:          3
QRSD:         87                       T:            109
QT:           364                                    
QTc:          451                                    
                           Interpretive Statements
Sinus rhythm
Low voltage, precordial leads
Nonspecific T abnormalities, lateral leads
Compared to ECG 2018 11:29:54
Low QRS voltage now present
T-wave abnormality now present
Sinus tachycardia no longer present
Ventricular premature complex(es) no longer present
Myocardial infarct finding no longer present
 
Electronically Signed On 2018 11:51:05 CST by Kaiden Ulloa
[email or web address]
 
 
 
 
 
 
 
 
 
 
 
 
 
  <ELECTRONICALLY SIGNED>
                                           By: Kaiden Ulloa MD, FACC   
  18     1151
D: 1848   _____________________________________
T: 18 0848   Kaiden Ulloa MD, [facility]     /EPI

## 2018-02-04 NOTE — NUR
PT HAD MET SEPSIS PROTOCOL AND BP WAS 73/44, SPOKE WITH DR. PETERS AT 0359,
ORDERS FOR A FLUID BOLUS, DISCUSSED WITH PATIENT

## 2018-02-05 VITALS — SYSTOLIC BLOOD PRESSURE: 113 MMHG | DIASTOLIC BLOOD PRESSURE: 81 MMHG

## 2018-02-05 VITALS — SYSTOLIC BLOOD PRESSURE: 100 MMHG | DIASTOLIC BLOOD PRESSURE: 69 MMHG

## 2018-02-05 VITALS — SYSTOLIC BLOOD PRESSURE: 112 MMHG | DIASTOLIC BLOOD PRESSURE: 69 MMHG

## 2018-02-05 VITALS — SYSTOLIC BLOOD PRESSURE: 122 MMHG | DIASTOLIC BLOOD PRESSURE: 69 MMHG

## 2018-02-05 VITALS — DIASTOLIC BLOOD PRESSURE: 69 MMHG | SYSTOLIC BLOOD PRESSURE: 112 MMHG

## 2018-02-05 LAB
ALBUMIN SERPL-MCNC: 1.6 G/DL (ref 3.4–5)
ALP SERPL-CCNC: 186 U/L (ref 46–116)
ALT SERPL-CCNC: 43 U/L (ref 30–65)
ANION GAP SERPL CALC-SCNC: 8 MMOL/L (ref 7–16)
ANION GAP SERPL CALC-SCNC: 9 MMOL/L (ref 7–16)
AST SERPL-CCNC: 155 U/L (ref 15–37)
BILIRUB SERPL-MCNC: 6.6 MG/DL
BUN SERPL-MCNC: 9 MG/DL (ref 7–18)
CALCIUM SERPL-MCNC: 6.6 MG/DL (ref 8.5–10.1)
CANCER AG19-9 SERPL-ACNC: 163 U/ML (ref 0–35)
CHLORIDE SERPL-SCNC: 100 MMOL/L (ref 98–107)
CHLORIDE SERPL-SCNC: 100 MMOL/L (ref 98–107)
CO2 SERPL-SCNC: 20 MMOL/L (ref 21–32)
CO2 SERPL-SCNC: 22 MMOL/L (ref 21–32)
CREAT SERPL-MCNC: 0.6 MG/DL (ref 0.6–1.3)
GLUCOSE SERPL-MCNC: 99 MG/DL (ref 70–99)
HCT VFR BLD CALC: 26.2 % (ref 37–47)
HGB BLD-MCNC: 9.1 GM/DL (ref 12–15)
INR PPP: 1.7
MAGNESIUM SERPL-MCNC: 1.4 MG/DL (ref 1.8–2.4)
MCH RBC QN AUTO: 34.1 PG (ref 26–34)
MCHC RBC AUTO-ENTMCNC: 34.7 G/DL (ref 28–37)
MCV RBC: 98.2 FL (ref 80–100)
MPV: 11.8 FL. (ref 7.2–11.1)
PLATELET COUNT*: 154 THOU/UL (ref 150–400)
POTASSIUM SERPL-SCNC: 3.6 MMOL/L (ref 3.5–5.1)
POTASSIUM SERPL-SCNC: 3.9 MMOL/L (ref 3.5–5.1)
PROT SERPL-MCNC: 5.8 G/DL (ref 6.4–8.2)
PROTHROMBIN TIME: 16.3 SECONDS (ref 9.2–11.5)
RBC # BLD AUTO: 2.67 MIL/UL (ref 4.2–5)
RDW-CV: 21.6 % (ref 10.5–14.5)
SODIUM SERPL-SCNC: 129 MMOL/L (ref 136–145)
SODIUM SERPL-SCNC: 130 MMOL/L (ref 136–145)
WBC # BLD AUTO: 13.9 THOU/UL (ref 4–11)

## 2018-02-05 NOTE — NUR
Nutrition: Pt assessed for LOS. Wt usually fluctuates around 150#. Regular
diet with good appetite. Labs, RX, hx noted. H/o alcoholic steatohepatitis,
gastritis. Alb 1.6, prealb 6.2, elevated LFTs. Liver BX pending. No nutrition
interventions needed at this time. Will follow up per protocol.

## 2018-02-05 NOTE — NUR
PT SLEPT MOST OF SHIFT. ASSESSMENT AS DOCUMENTED. MEDS GIVEN PER E-MAR. NO
REPORTS OF PAIN OR NAUSEA. IV PATENT. NO CONCERNS AT THIS TIME.

## 2018-02-05 NOTE — 2DMMODE
[place] [address]
Phone:  [phone] 2 D/M-MODE ECHOCARDIOGRAM     
_______________________________________________________________________________
 
Name:         LARA RAO            Room:          [address] [place] 
M.R.#:    [id number]     Account #:     [id number]  
Admission:    18    Attend Phys:   Franko Garcia, 
Discharge:                Date of Birth: [birth year]  
Date of Service: 18 1711  Report #:      [id number]
        [id number]
_______________________________________________________________________________
THIS REPORT FOR:  //name//                      
 
 
--------------- APPROVED REPORT --------------
 
 
Study performed:  2018 16:25:11
 
EXAM: Comprehensive 2D, Doppler, and color-flow 
Echocardiogram 
Patient Location: Bedside   
 
      BSA:         1.14
HR: 115 bpm BP:          100/69 mmHg 
 
Other Information 
Study Quality: Fair
 
Indications
Congestive Heart Failure
 
2D Dimensions
   LVEF(%):  45.41 (&gt;50%)
IVSd:  12.40 (7-11mm) LVOT Diam:  19.93 (18-24mm) 
LVDd:  35.51 mm  
PWd:  11.26 (7-11mm) Ascending Ao:  30.87 (22-36mm)
LVDs:  27.69 (25-40mm) 
Aortic Root:  30.22 mm 
   Perea's LVEF:  45.41 %
 
Volumes
Left Atrial Volume (Systole) 
    LA ESV Index:  52.00 mL/m2
 
Aortic Valve
AoV Peak Robert.:  1.55 m/s 
AO Peak Gr.:  9.67 mmHg  LVOT Max P.07 mmHg
AO Mean Gr.:  5.50 mmHg  LVOT Mean PG:  3.22 mmHg
    LVOT Max V:  1.23 m/s
AO V2 VTI:  25.17 cm  LVOT Mean V:  0.84 m/s
CARLI (VTI):  2.56 cm2  LVOT V1 VTI:  20.64 cm
 
Mitral Valve
    E/A Ratio:  1.08
    MV Decel. Time:  133.60 ms
MV E Max Robert.:  0.91 m/s 
 
 
[place] [address]
Phone:  [phone]                     2 D/M-MODE ECHOCARDIOGRAM     
_______________________________________________________________________________
 
Name:         LARA RAO            Room:          [address] [place] 
.R.#:    [id number]     Account #:     [id number]  
Admission:    18    Attend Phys:   Franko Garcia, 
Discharge:                Date of Birth: [birth year]  
Date of Service: 18 1711  Report #:      [id number]
        [id number]
_______________________________________________________________________________
MV PHT:  38.74 ms  
MVA (PHT):  5.68 cm2  
 
TDI
E/Lateral E':  8.27 E/Medial E':  7.00
   Medial E' Robert.:  0.13 m/s
   Lateral E' Robert.:  0.11 m/s
 
Pulmonary Valve
PV Peak Robert.:  1.24 m/s PV Peak Gr.:  6.17 mmHg
 
Left Ventricle
The left ventricle is normal size. There is normal LV segmental wall 
motion. Mild concentric left ventricular hypertrophy. Left 
ventricular systolic function is normal. The left ventricular 
ejection fraction is within the normal range. LVEF is 55-60%. Grade I 
- abnormal relaxation pattern.
 
Right Ventricle
The right ventricle is normal size. The right ventricular systolic 
function is normal.
 
Atria
The left atrium size is normal. The right atrium size is 
normal.
 
Aortic Valve
The aortic valve is normal in structure. No aortic regurgitation is 
present. There is no aortic valvular stenosis.
 
Mitral Valve
The mitral valve is normal in structure. Trace mitral regurgitation. 
No evidence of mitral valve stenosis.
 
Tricuspid Valve
The tricuspid valve is normal in structure. Mild tricuspid 
regurgitation. estimated pa pressure 40 mm Hg
 
Pulmonic Valve
Pulmonic valve is not well visualized. There is no pulmonic valvular 
regurgitation.
 
Great Vessels
The aortic root is normal in size. IVC is not 
visualized.
 
 
 
[place] [address]
Phone:  [phone]                     2 D/M-MODE ECHOCARDIOGRAM     
_______________________________________________________________________________
 
Name:         LARA RAO            Room:          [address] [place] 
[facility]#:    [id number]     Account #:     [id number]  
Admission:    18    Attend Phys:   Franko Garcia, 
Discharge:                Date of Birth: [birth year]  
Date of Service: 18 1711  Report #:      [id number]
        [id number]
_______________________________________________________________________________
Pericardium
There is no pericardial effusion.
 
&lt;Conclusion&gt;
LVEF is 55-60%.
Mild concentric left ventricular hypertrophy.
Mild tricuspid regurgitation.
estimated pa pressure 40 mm Hg
 
 
 
 
 
 
 
 
 
 
 
 
 
 
 
 
 
 
 
 
 
 
 
 
 
 
 
 
 
 
 
 
 
 
 
 
  <ELECTRONICALLY SIGNED>
                                           By: Cesar Valverde MD, FAC      
  18
D: 18   _____________________________________
T: 18   Cesar Valverde MD, FAC        /INF

## 2018-02-06 NOTE — CON
[address]  [address]                    CONSULTATION                  
_______________________________________________________________________________
 
Name:       LARA RAO             Room:           [address] [place]  
M.R.#:  [id number]      Account #:      [id number]  
Admission:  18     Attend Phys:    Franko Garcia MD 
Discharge:  18     Date of Birth:  [birth year]  
         Report #: [id number]
                                                                     [id number]  
_______________________________________________________________________________
THIS REPORT FOR:  //name//                      
 
CC: Franko Patel DO
 
DATE OF SERVICE:  2018
 
 
Cardiology Consultation
 
 
HISTORY OF PRESENT ILLNESS:  The patient is a 42-year-old  white female
who I was asked to see in the hospital today because of hypotension.  The
patient denies a history of heart disease.  She does not exercise on a regular
basis.  She does have a history of hypertension.  She was here in 2016 with
hepatitis, felt to be related to alcohol abuse.  She was presented to [facility]
on , a week ago, with nausea.  Apparently, she had vomiting and
noted some blood in the vomit.  She also noticed some dark stool.  She was seen
by the GI service.  She underwent a liver biopsy and was found to have
cirrhosis.  She is noted to be anemic.  Her blood pressure has been running low,
so I was asked to see her for further evaluation and treatment.  She denies a
history of heart disease.  She denies any chest pain, shortness of breath,
palpitation, syncope or edema.  She has no distended abdomen and a yellow
discoloration of her skin.
 
PAST MEDICAL HISTORY:  Otherwise, she is , last menstrual period started
recently.  She has had previous appendectomy, tonsillectomy, breast
augmentation.  She has a history of hypertension.  No history of diabetes,
hyperlipidemia.
 
HOME MEDICATIONS:  Include Prinivil.
 
ALLERGIES:  She has no known drug allergies.
 
FAMILY HISTORY:  Negative for heart disease.
 
SOCIAL HISTORY:  She is .  She and her  live in [place].  She
works in plant.  No smoking.  She previously drank every day up to 6 pack of
beer a day and a pint of whiskey a day.  She quit about 5 years ago and went to
.  Denies illicit drug use.
 
REVIEW OF SYSTEMS:  She has had no history of stroke, asthma, peptic ulcer
disease, liver disease, cancer, psychiatric illness, chronic skin condition.
 
PHYSICAL EXAMINATION:
 
 
 
[place] [address]                    CONSULTATION                  
_______________________________________________________________________________
 
Name:       LARA RAO             Room:           [address]#:  [id number]      Account #:      [id number]  
Admission:  18     Attend Phys:    Franko Garcia MD 
Discharge:  18     Date of Birth:  [birth year]  
         Report #: [id number]
                                                                     [id number]  
_______________________________________________________________________________
GENERAL:  Revealed a middle-aged female lying in bed.  She appeared in no
distress.
VITAL SIGNS:  She had blood pressure of only 90/60, pulse is 90.  She was
afebrile.
HEENT:  She was icteric.  Mucous members are moist.
NECK:  Veins do not appear distended.
CHEST:  Clear to auscultation.
CARDIOVASCULAR:  Regular rate and rhythm.  No significant murmur.
ABDOMEN:  Distended, fluid wave is noted.
EXTREMITIES:  Had no edema.  Dorsalis pedis pulse 2+ bilaterally.
SKIN:  Warm and dry.
 
RADIOLOGICAL DATA:  Her ECG on admission showed sinus tachycardia, occasional
PVC, nonspecific ST and T-wave changes, poor R-wave progression.  Her workup
since she has been here now for a week included a chest x-ray done yesterday,
normal heart size, atelectasis, small effusion, consist pulmonary edema.
 
LABORATORY DATA:  Sodium 130, BUN 9, creatinine 0.6, SGOT 155, bilirubin 6.6,
SGPT 43, alkaline phosphate is 186, GGTP 609.  Albumin 1.6.  TSH in 2017 is 4.7.
 Her white blood cell count 13.9, hemoglobin 9.1, hematocrit 26.2, platelet
count 154,000.
 
IMPRESSION AND RECOMMENDATIONS:
1.  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
2.  Hypotension:  I would avoid dehydration.  If blood pressure remains low, I
would consider discontinuing this small dose of beta-blocker.  The patient has
been taken off her ACE inhibitor.
3.  Anemia.
4.  History of gastrointestinal bleeding.
 
 
 
 
 
 
 
 
 
 
 
 
 
 
 
<ELECTRONICALLY SIGNED>
                                        By:  Cesar Valverde MD, FACC      
18     1704
D: 18 1319_______________________________________
T: 18 1631Ddot Valverde MD, FACC         /nt

## 2018-02-07 NOTE — S
[place] [address]                    SURGICAL PATH RPT PROCEDURE   
_______________________________________________________________________________
 
Name:       LUMA ALVAREZ TEN             Room:           [address] [place]  
M.R.#:  [id number]      Account #:      [id number]  
Admission:  01/29/18     Date of Birth:  [birth year]  
Discharge:  02/05/18                   Report #:    [id number]
                                                         Path Case #: [id number] 
_______________________________________________________________________________
 
  
PATHOLOGY REPORT 
    
COLLECTION DATE: 2/1/2018   RECEIVED DATE: 2/1/2018  
 SUBMITTING PHYS: Dr. Franko Garcia
OTHER PHYS:
Dr. Aviva Lindo 
 
ADDENDUM REPORT (Order Date:  2/7/2018 00:00)
 
ADDENDUM COMMENT:
Please see next page for scanned image of report submitted by [facility] consultant pathologist, CHRISTO ReedSDylan  (HANS:karsten;
d/t: 02/07/2018)
ELECTRONICALLY SIGNED BY: Boyd Sargent M.D.
     DATE/TIME:2/7/2018 16:27
 
    
SPECIMEN(S) RECEIVED:
A.Liver biopsy
    
* * * * * * * * * * * *
   
FINAL DIAGNOSIS:
Liver biopsy:
- Benign liver with cirrhosis, moderate to severe macrovesicular
steatohepatitis compatible with alcoholic steatohepatitis, evidence
of bile stasis and with bile ductular proliferation in association
with mild to moderate chronic portal inflammation.  See comment.
 
COMMENT:
Properly controlled special stains are performed on A1 with results
as follows: 
Trichrome demonstrates bridging/nodular fibrosis
Reticulin - Not properly controlled
Iron - No increase of stainable iron
PAS with and without diastase - No positive globules
In the setting of steatohepatitis there is abundant Tisha's hyaline
seen typical of alcoholic steatohepatitis.  Other features including
bile stasis and ductular proliferation with portal inflammation are
also present of uncertain etiology.  This case will be forwarded to
the [facility], Department of Hepatopathology for further assessment
and an addendum report will be issued.
(HANS:db; 2/02/2018) 
 
PATHOLOGIST:   Boyd Sargent M.D. 
REPORT ELECTRONICALLY SIGNED BY:   Boyd Sargent M.D.
 
[place] [address]                    SURGICAL PATH RPT PROCEDURE   
_______________________________________________________________________________
 
Name:       LUMA ALVAREZ             Room:           [address] [place]  
..#:  [id number]      Account #:      [id number]  
Admission:  01/29/18     Date of Birth:  [birth year]  
Discharge:  02/05/18                   Report #:    [id number]
                                                         Path Case #: [id number] 
_______________________________________________________________________________
DATE/TIME:   2/5/2018 11:26
    
* * * * * * * * * * * *
 
GROSS PATHOLOGY:
Received in formalin labeled "Luma Alvarez, liver biopsy," are 3
distinct needle cores of tan soft tissue ranging from 0.7 to 1.0 cm
in length, which are submitted entirely in cassette A1.
(TSD; 2/1/2018)
 
 
CLINICAL HISTORY:
Cirrhosis
 
INITIAL CPT CODE(S):
A; 43272, 20523, 20434, 98560, 14257
Professional services performed by [facility] at 
[facility] 
[address],  [place] [address]
 
  Technical services performed by [facility] at [address] [place] [address],
[address], [place] [address].
 
 
   
[facility]
[address] [place] [address]
PHONE:  [phone]
DIRECTOR:  Spencer W. Kerley, M.D.
* * *  END OF REPORT  * * *

## 2018-05-17 ENCOUNTER — HOSPITAL ENCOUNTER (INPATIENT)
Dept: HOSPITAL 96 - M.ERS | Age: 43
LOS: 4 days | Discharge: HOME | DRG: 432 | End: 2018-05-21
Attending: INTERNAL MEDICINE | Admitting: INTERNAL MEDICINE
Payer: COMMERCIAL

## 2018-05-17 VITALS — HEIGHT: 62.99 IN | BODY MASS INDEX: 27.64 KG/M2 | WEIGHT: 156 LBS

## 2018-05-17 VITALS — DIASTOLIC BLOOD PRESSURE: 59 MMHG | SYSTOLIC BLOOD PRESSURE: 115 MMHG

## 2018-05-17 VITALS — DIASTOLIC BLOOD PRESSURE: 56 MMHG | SYSTOLIC BLOOD PRESSURE: 116 MMHG

## 2018-05-17 VITALS — DIASTOLIC BLOOD PRESSURE: 63 MMHG | SYSTOLIC BLOOD PRESSURE: 126 MMHG

## 2018-05-17 VITALS — SYSTOLIC BLOOD PRESSURE: 89 MMHG | DIASTOLIC BLOOD PRESSURE: 51 MMHG

## 2018-05-17 VITALS — SYSTOLIC BLOOD PRESSURE: 87 MMHG | DIASTOLIC BLOOD PRESSURE: 49 MMHG

## 2018-05-17 DIAGNOSIS — D72.829: ICD-10-CM

## 2018-05-17 DIAGNOSIS — R00.0: ICD-10-CM

## 2018-05-17 DIAGNOSIS — I10: ICD-10-CM

## 2018-05-17 DIAGNOSIS — F41.9: ICD-10-CM

## 2018-05-17 DIAGNOSIS — D64.9: ICD-10-CM

## 2018-05-17 DIAGNOSIS — D63.8: ICD-10-CM

## 2018-05-17 DIAGNOSIS — D69.6: ICD-10-CM

## 2018-05-17 DIAGNOSIS — E83.39: ICD-10-CM

## 2018-05-17 DIAGNOSIS — Z90.49: ICD-10-CM

## 2018-05-17 DIAGNOSIS — K70.31: Primary | ICD-10-CM

## 2018-05-17 DIAGNOSIS — E83.42: ICD-10-CM

## 2018-05-17 DIAGNOSIS — K55.039: ICD-10-CM

## 2018-05-17 DIAGNOSIS — R65.11: ICD-10-CM

## 2018-05-17 DIAGNOSIS — E87.6: ICD-10-CM

## 2018-05-17 DIAGNOSIS — Z79.899: ICD-10-CM

## 2018-05-17 DIAGNOSIS — E44.0: ICD-10-CM

## 2018-05-17 DIAGNOSIS — K64.8: ICD-10-CM

## 2018-05-17 DIAGNOSIS — K76.6: ICD-10-CM

## 2018-05-17 LAB
%HYPO/RBC NFR BLD AUTO: (no result) %
ABSOLUTE MONOCYTES: 0.3 THOU/UL (ref 0–1.2)
ALBUMIN SERPL-MCNC: 2.5 G/DL (ref 3.4–5)
ALP SERPL-CCNC: 177 U/L (ref 46–116)
ALT SERPL-CCNC: 33 U/L (ref 30–65)
ANION GAP SERPL CALC-SCNC: 11 MMOL/L (ref 7–16)
AST SERPL-CCNC: 57 U/L (ref 15–37)
BILIRUB SERPL-MCNC: 3.7 MG/DL
BILIRUB UR-MCNC: (no result) MG/DL
BUN SERPL-MCNC: 11 MG/DL (ref 7–18)
CALCIUM SERPL-MCNC: 7.9 MG/DL (ref 8.5–10.1)
CHLORIDE SERPL-SCNC: 105 MMOL/L (ref 98–107)
CO2 SERPL-SCNC: 22 MMOL/L (ref 21–32)
COLOR UR: YELLOW
CREAT SERPL-MCNC: 0.8 MG/DL (ref 0.6–1.3)
GLUCOSE SERPL-MCNC: 118 MG/DL (ref 70–99)
GRANULOCYTES NFR BLD MANUAL: 95 %
HCT VFR BLD CALC: 20 % (ref 37–47)
HGB BLD-MCNC: 6.5 GM/DL (ref 12–15)
KETONES UR STRIP-MCNC: (no result) MG/DL
LIPASE: 130 U/L (ref 73–393)
LYMPHOCYTES # BLD: 0.4 THOU/UL (ref 0.8–5.3)
LYMPHOCYTES NFR BLD AUTO: 3 %
MCH RBC QN AUTO: 28.3 PG (ref 26–34)
MCHC RBC AUTO-ENTMCNC: 32.7 G/DL (ref 28–37)
MCV RBC: 86.4 FL (ref 80–100)
MONOCYTES NFR BLD: 2 %
MPV: 7.9 FL. (ref 7.2–11.1)
MUCUS: (no result) STRN/LPF
NEUTROPHILS # BLD: 13.2 THOU/UL (ref 1.6–8.1)
NUCLEATED RBCS: 0 /100WBC
PLATELET # BLD EST: ADEQUATE 10*3/UL
PLATELET COUNT*: 108 THOU/UL (ref 150–400)
POTASSIUM SERPL-SCNC: 3.4 MMOL/L (ref 3.5–5.1)
PROT SERPL-MCNC: 6.8 G/DL (ref 6.4–8.2)
PROT UR QL STRIP: (no result)
RBC # BLD AUTO: 2.31 MIL/UL (ref 4.2–5)
RBC # UR STRIP: (no result) /UL
RBC #/AREA URNS HPF: (no result) /HPF (ref 0–2)
RDW-CV: 14.8 % (ref 10.5–14.5)
SODIUM SERPL-SCNC: 138 MMOL/L (ref 136–145)
SP GR UR STRIP: 1.02 (ref 1–1.03)
SQUAMOUS: (no result) /LPF (ref 0–3)
URINE CLARITY: CLEAR
URINE GLUCOSE-RANDOM: NEGATIVE
URINE LEUKOCYTES-REFLEX: (no result)
URINE NITRITE-REFLEX: NEGATIVE
URINE WBC-REFLEX: (no result) /HPF (ref 0–5)
UROBILINOGEN UR STRIP-ACNC: 2 E.U./DL (ref 0.2–1)
WBC # BLD AUTO: 13.9 THOU/UL (ref 4–11)

## 2018-05-17 NOTE — PROC
[address]  [address]                    PROCEDURE REPORT              
_______________________________________________________________________________
 
Name:       LARA RAO             Room:           [address] [place]  
.R.#:  [id number]      Account #:      [id number]  
Admission:  05/17/18     Attend Phys:    Franko Garcia MD 
Discharge:  05/21/18     Date of Birth:  [birth year]  
         Report #: [id number]
                                                                                
_______________________________________________________________________________
THIS REPORT FOR:  //name//                      
 
For GI report, please see the Provation report in Perceptive 7 content.
 
 
 
 
 
 
 
 
 
 
 
 
 
 
 
 
 
 
 
 
 
 
 
 
 
 
 
 
 
 
 
 
 
 
 
 
 
 
 
 
 
                       
                                        By:                                
                 
D: 05/21/18     _______________________________________
T: 05/23/18 0619Medical Records Staff GERALD       /AL

## 2018-05-18 VITALS — SYSTOLIC BLOOD PRESSURE: 109 MMHG | DIASTOLIC BLOOD PRESSURE: 71 MMHG

## 2018-05-18 VITALS — SYSTOLIC BLOOD PRESSURE: 104 MMHG | DIASTOLIC BLOOD PRESSURE: 71 MMHG

## 2018-05-18 VITALS — SYSTOLIC BLOOD PRESSURE: 112 MMHG | DIASTOLIC BLOOD PRESSURE: 70 MMHG

## 2018-05-18 VITALS — SYSTOLIC BLOOD PRESSURE: 90 MMHG | DIASTOLIC BLOOD PRESSURE: 56 MMHG

## 2018-05-18 VITALS — DIASTOLIC BLOOD PRESSURE: 60 MMHG | SYSTOLIC BLOOD PRESSURE: 100 MMHG

## 2018-05-18 LAB
ABSOLUTE BASOPHILS: 0 THOU/UL (ref 0–0.2)
ABSOLUTE EOSINOPHILS: 0.1 THOU/UL (ref 0–0.7)
ABSOLUTE MONOCYTES: 0.6 THOU/UL (ref 0–1.2)
ANION GAP SERPL CALC-SCNC: 6 MMOL/L (ref 7–16)
BASOPHILS NFR BLD AUTO: 0.5 %
BUN SERPL-MCNC: 12 MG/DL (ref 7–18)
CALCIUM SERPL-MCNC: 7.5 MG/DL (ref 8.5–10.1)
CHLORIDE SERPL-SCNC: 106 MMOL/L (ref 98–107)
CO2 SERPL-SCNC: 25 MMOL/L (ref 21–32)
CREAT SERPL-MCNC: 0.8 MG/DL (ref 0.6–1.3)
EOSINOPHIL NFR BLD: 0.8 %
GLUCOSE SERPL-MCNC: 96 MG/DL (ref 70–99)
GRANULOCYTES NFR BLD MANUAL: 83.5 %
HCT VFR BLD CALC: 20.4 % (ref 37–47)
HGB BLD-MCNC: 6.8 GM/DL (ref 12–15)
INR PPP: 1.6
LYMPHOCYTES # BLD: 0.8 THOU/UL (ref 0.8–5.3)
LYMPHOCYTES NFR BLD AUTO: 8.7 %
MAGNESIUM SERPL-MCNC: 1.6 MG/DL (ref 1.8–2.4)
MCH RBC QN AUTO: 29.1 PG (ref 26–34)
MCHC RBC AUTO-ENTMCNC: 33.3 G/DL (ref 28–37)
MCV RBC: 87.2 FL (ref 80–100)
MONOCYTES NFR BLD: 6.5 %
MPV: 7.9 FL. (ref 7.2–11.1)
NEUTROPHILS # BLD: 7.9 THOU/UL (ref 1.6–8.1)
NUCLEATED RBCS: 0 /100WBC
PLATELET COUNT*: 82 THOU/UL (ref 150–400)
POTASSIUM SERPL-SCNC: 2.9 MMOL/L (ref 3.5–5.1)
POTASSIUM SERPL-SCNC: 3 MMOL/L (ref 3.5–5.1)
PROTHROMBIN TIME: 15.6 SECONDS (ref 9.2–11.5)
RBC # BLD AUTO: 2.34 MIL/UL (ref 4.2–5)
RDW-CV: 14.5 % (ref 10.5–14.5)
SODIUM SERPL-SCNC: 137 MMOL/L (ref 136–145)
WBC # BLD AUTO: 9.4 THOU/UL (ref 4–11)

## 2018-05-18 PROCEDURE — B4151ZZ FLUOROSCOPY OF INFERIOR MESENTERIC ARTERY USING LOW OSMOLAR CONTRAST: ICD-10-PCS | Performed by: RADIOLOGY

## 2018-05-18 PROCEDURE — B41D1ZZ FLUOROSCOPY OF AORTA AND BILATERAL LOWER EXTREMITY ARTERIES USING LOW OSMOLAR CONTRAST: ICD-10-PCS

## 2018-05-18 PROCEDURE — 30233N1 TRANSFUSION OF NONAUTOLOGOUS RED BLOOD CELLS INTO PERIPHERAL VEIN, PERCUTANEOUS APPROACH: ICD-10-PCS

## 2018-05-18 PROCEDURE — B4141ZZ FLUOROSCOPY OF SUPERIOR MESENTERIC ARTERY USING LOW OSMOLAR CONTRAST: ICD-10-PCS | Performed by: INTERNAL MEDICINE

## 2018-05-18 NOTE — EKG
[place] [address]
Phone:  [phone]                     ELECTROCARDIOGRAM REPORT      
_______________________________________________________________________________
 
Name:       LARA RAO             Room:           [address] [place]  
.R.#:  [id number]      Account #:      [id number]  
Admission:  18     Attend Phys:    Franko Garcia MD 
Discharge:               Date of Birth:  [birth year]  
         Report #: [id number]
    [id number]
_______________________________________________________________________________
THIS REPORT FOR:  //name//                      
 
                         [facility] ED
                                       
Test Date:    2018               Test Time:    16:14:12
Pat Name:     LARA RAO         Department:   
Patient ID:   [id number]            Room:          
Gender:       F                        Technician:   
:          1975               Requested By: Zoë Forbes
Order Number: [id number] MD:   Cesar Valverde
                                 Measurements
Intervals                              Axis          
Rate:         126                      P:            6
NE:           123                      QRS:          -11
QRSD:         70                       T:            100
QT:           313                                    
QTc:          454                                    
                           Interpretive Statements
Sinus tachycardia
consider Inferior infarct, old
Anteroseptal infarct, old
Lateral leads are also involved
Compared to ECG 2018 08:48:32
 
Sinus rhythm no longer present
 
 
Electronically Signed On 2018 11:50:18 CDT by Cesar Valverde
[email or web address]
 
 
 
 
 
 
 
 
 
 
 
 
 
 
  <ELECTRONICALLY SIGNED>
                                           By: Cesar Valverde MD, [facility]      
  18     1150
D: 18 1614   _____________________________________
T: 18 1614   Cesar Valverde MD, [facility]        /EPI

## 2018-05-19 VITALS — SYSTOLIC BLOOD PRESSURE: 107 MMHG | DIASTOLIC BLOOD PRESSURE: 69 MMHG

## 2018-05-19 VITALS — SYSTOLIC BLOOD PRESSURE: 112 MMHG | DIASTOLIC BLOOD PRESSURE: 69 MMHG

## 2018-05-19 VITALS — SYSTOLIC BLOOD PRESSURE: 110 MMHG | DIASTOLIC BLOOD PRESSURE: 67 MMHG

## 2018-05-19 VITALS — DIASTOLIC BLOOD PRESSURE: 79 MMHG | SYSTOLIC BLOOD PRESSURE: 151 MMHG

## 2018-05-19 LAB
ABSOLUTE EOSINOPHILS: 0.1 THOU/UL (ref 0–0.7)
ABSOLUTE MONOCYTES: 0.2 THOU/UL (ref 0–1.2)
ANION GAP SERPL CALC-SCNC: 7 MMOL/L (ref 7–16)
BUN SERPL-MCNC: 7 MG/DL (ref 7–18)
CALCIUM SERPL-MCNC: 7.3 MG/DL (ref 8.5–10.1)
CHLORIDE SERPL-SCNC: 104 MMOL/L (ref 98–107)
CO2 SERPL-SCNC: 24 MMOL/L (ref 21–32)
CREAT SERPL-MCNC: 0.6 MG/DL (ref 0.6–1.3)
EOSINOPHIL NFR BLD: 2 %
GLUCOSE SERPL-MCNC: 122 MG/DL (ref 70–99)
GRANULOCYTES NFR BLD MANUAL: 88 %
HCT VFR BLD CALC: 23.9 % (ref 37–47)
HCT VFR BLD CALC: 26.9 % (ref 37–47)
HGB BLD-MCNC: 7.9 GM/DL (ref 12–15)
HGB BLD-MCNC: 9 GM/DL (ref 12–15)
LYMPHOCYTES # BLD: 0.5 THOU/UL (ref 0.8–5.3)
LYMPHOCYTES NFR BLD AUTO: 7 %
MCH RBC QN AUTO: 28.8 PG (ref 26–34)
MCH RBC QN AUTO: 29 PG (ref 26–34)
MCHC RBC AUTO-ENTMCNC: 33.1 G/DL (ref 28–37)
MCHC RBC AUTO-ENTMCNC: 33.4 G/DL (ref 28–37)
MCV RBC: 87 FL (ref 80–100)
MCV RBC: 87.1 FL (ref 80–100)
MONOCYTES NFR BLD: 3 %
MPV: 8 FL. (ref 7.2–11.1)
MPV: 8.5 FL. (ref 7.2–11.1)
NEUTROPHILS # BLD: 5.8 THOU/UL (ref 1.6–8.1)
NUCLEATED RBCS: 0 /100WBC
PLATELET # BLD EST: ADEQUATE 10*3/UL
PLATELET COUNT*: 101 THOU/UL (ref 150–400)
PLATELET COUNT*: 105 THOU/UL (ref 150–400)
POTASSIUM SERPL-SCNC: 3.1 MMOL/L (ref 3.5–5.1)
RBC # BLD AUTO: 2.74 MIL/UL (ref 4.2–5)
RBC # BLD AUTO: 3.09 MIL/UL (ref 4.2–5)
RBC MORPH BLD: NORMAL
RDW-CV: 14.9 % (ref 10.5–14.5)
RDW-CV: 15.2 % (ref 10.5–14.5)
SODIUM SERPL-SCNC: 135 MMOL/L (ref 136–145)
WBC # BLD AUTO: 6.6 THOU/UL (ref 4–11)
WBC # BLD AUTO: 7.9 THOU/UL (ref 4–11)

## 2018-05-20 VITALS — DIASTOLIC BLOOD PRESSURE: 72 MMHG | SYSTOLIC BLOOD PRESSURE: 110 MMHG

## 2018-05-20 VITALS — SYSTOLIC BLOOD PRESSURE: 114 MMHG | DIASTOLIC BLOOD PRESSURE: 72 MMHG

## 2018-05-20 VITALS — SYSTOLIC BLOOD PRESSURE: 122 MMHG | DIASTOLIC BLOOD PRESSURE: 74 MMHG

## 2018-05-20 VITALS — SYSTOLIC BLOOD PRESSURE: 102 MMHG | DIASTOLIC BLOOD PRESSURE: 62 MMHG

## 2018-05-20 LAB
ANION GAP SERPL CALC-SCNC: 10 MMOL/L (ref 7–16)
BUN SERPL-MCNC: 3 MG/DL (ref 7–18)
CALCIUM SERPL-MCNC: 7.3 MG/DL (ref 8.5–10.1)
CHLORIDE SERPL-SCNC: 102 MMOL/L (ref 98–107)
CO2 SERPL-SCNC: 24 MMOL/L (ref 21–32)
CREAT SERPL-MCNC: 0.6 MG/DL (ref 0.6–1.3)
GLUCOSE SERPL-MCNC: 135 MG/DL (ref 70–99)
HCT VFR BLD CALC: 23.3 % (ref 37–47)
HGB BLD-MCNC: 7.9 GM/DL (ref 12–15)
MAGNESIUM SERPL-MCNC: 1.2 MG/DL (ref 1.8–2.4)
MAGNESIUM SERPL-MCNC: 2.1 MG/DL (ref 1.8–2.4)
MCH RBC QN AUTO: 29 PG (ref 26–34)
MCHC RBC AUTO-ENTMCNC: 33.7 G/DL (ref 28–37)
MCV RBC: 85.9 FL (ref 80–100)
MPV: 8.4 FL. (ref 7.2–11.1)
PLATELET COUNT*: 105 THOU/UL (ref 150–400)
POTASSIUM SERPL-SCNC: 2.8 MMOL/L (ref 3.5–5.1)
POTASSIUM SERPL-SCNC: 3.7 MMOL/L (ref 3.5–5.1)
RBC # BLD AUTO: 2.72 MIL/UL (ref 4.2–5)
RDW-CV: 15.2 % (ref 10.5–14.5)
SODIUM SERPL-SCNC: 136 MMOL/L (ref 136–145)
WBC # BLD AUTO: 5.7 THOU/UL (ref 4–11)

## 2018-05-21 VITALS — SYSTOLIC BLOOD PRESSURE: 132 MMHG | DIASTOLIC BLOOD PRESSURE: 84 MMHG

## 2018-05-21 VITALS — SYSTOLIC BLOOD PRESSURE: 122 MMHG | DIASTOLIC BLOOD PRESSURE: 72 MMHG

## 2018-05-21 VITALS — DIASTOLIC BLOOD PRESSURE: 63 MMHG | SYSTOLIC BLOOD PRESSURE: 111 MMHG

## 2018-05-21 VITALS — DIASTOLIC BLOOD PRESSURE: 84 MMHG | SYSTOLIC BLOOD PRESSURE: 132 MMHG

## 2018-05-21 VITALS — DIASTOLIC BLOOD PRESSURE: 74 MMHG | SYSTOLIC BLOOD PRESSURE: 122 MMHG

## 2018-05-21 LAB
ALBUMIN SERPL-MCNC: 2.2 G/DL (ref 3.4–5)
ALP SERPL-CCNC: 135 U/L (ref 46–116)
ALT SERPL-CCNC: 23 U/L (ref 30–65)
ANION GAP SERPL CALC-SCNC: 7 MMOL/L (ref 7–16)
AST SERPL-CCNC: 39 U/L (ref 15–37)
BILIRUB SERPL-MCNC: 3.5 MG/DL
BUN SERPL-MCNC: 2 MG/DL (ref 7–18)
CALCIUM SERPL-MCNC: 7.5 MG/DL (ref 8.5–10.1)
CHLORIDE SERPL-SCNC: 107 MMOL/L (ref 98–107)
CO2 SERPL-SCNC: 24 MMOL/L (ref 21–32)
CREAT SERPL-MCNC: 0.7 MG/DL (ref 0.6–1.3)
GLUCOSE SERPL-MCNC: 141 MG/DL (ref 70–99)
HCT VFR BLD CALC: 24.6 % (ref 37–47)
HGB BLD-MCNC: 8.3 GM/DL (ref 12–15)
MAGNESIUM SERPL-MCNC: 1.7 MG/DL (ref 1.8–2.4)
MCH RBC QN AUTO: 29 PG (ref 26–34)
MCHC RBC AUTO-ENTMCNC: 33.5 G/DL (ref 28–37)
MCV RBC: 86.4 FL (ref 80–100)
MPV: 7.8 FL. (ref 7.2–11.1)
PLATELET COUNT*: 109 THOU/UL (ref 150–400)
POTASSIUM SERPL-SCNC: 3.4 MMOL/L (ref 3.5–5.1)
PROT SERPL-MCNC: 5.8 G/DL (ref 6.4–8.2)
RBC # BLD AUTO: 2.85 MIL/UL (ref 4.2–5)
RDW-CV: 15.4 % (ref 10.5–14.5)
SODIUM SERPL-SCNC: 138 MMOL/L (ref 136–145)
WBC # BLD AUTO: 4.8 THOU/UL (ref 4–11)

## 2018-05-21 PROCEDURE — 0DJD8ZZ INSPECTION OF LOWER INTESTINAL TRACT, VIA NATURAL OR ARTIFICIAL OPENING ENDOSCOPIC: ICD-10-PCS | Performed by: INTERNAL MEDICINE

## 2018-05-28 NOTE — CON
[address]  [address]                    CONSULTATION                  
_______________________________________________________________________________
 
Name:       LARA RAO             Room:           [address] [place]  
M.R.#:  [id number]      Account #:      [id number]  
Admission:  05/17/18     Attend Phys:    Franko Garcia MD 
Discharge:  05/21/18     Date of Birth:  [birth year]  
         Report #: [id number]
                                                                     [id number]  
_______________________________________________________________________________
THIS REPORT FOR:  //name//                      
 
CC: Franko Patel DO
 
DICTATED BY: Kerry Padilla [facility]
 
DATE OF SERVICE:  05/18/2018
 
 
PRIMARY CARE PHYSICIAN:
 
Please note at the time of this dictation, the patient was seen and physically
examined by myself.
 
REASON FOR CONSULTATION:  Abdominal pain and fever.
 
HISTORY OF PRESENT ILLNESS:  This is a 43-year-old female who presented to the
Emergency Room after initially having right upper quadrant to right-sided
abdominal pain that was abrupt onset.  She had noticed a low-grade fever up to
101 over the last 2 days prior to all of this.  She states she denied any nausea
or vomiting.  She did not have any diarrhea or any black or bright red bloody
stools noted.  She states her bowels move daily, soft and formed and she has not
had any issues with constipation.  The patient has a longstanding history of
alcohol abuse in which she quit drinking back in January of this year.  The
patient has never had a colonoscopy; however, her last EGD was in January of
this year, she had grade 1 esophageal varices and had a small hiatal hernia with
some portal hypertensive gastropathy at that time.
 
ALLERGIES:  No known drug allergies.
 
MEDICATIONS:  From home, she was on propranolol and pantoprazole.
 
PAST MEDICAL HISTORY:  Hypertension, history of pancreatitis, fatty liver and
alcoholic cirrhosis.
 
PAST SURGICAL HISTORY:  Appendectomy, tonsillectomy.
 
FAMILY HISTORY:  Negative for any GI or female cancers.
 
SOCIAL HISTORY:  Denies any tobacco use or illegal drug use, in past used to
have alcohol, none since January.
 
REVIEW OF SYSTEMS:  Twelve-point review of systems is essentially negative
except what is mentioned in the HPI.
 
 
 
[place] [address]                    CONSULTATION                  
_______________________________________________________________________________
 
Name:       LARA RAO             Room:           [address]#:  [id number]      Account #:      [id number]  
Admission:  05/17/18     Attend Phys:    Franko Garcia MD 
Discharge:  05/21/18     Date of Birth:  [birth year]  
         Report #: [id number]
                                                                     [id number]  
_______________________________________________________________________________
 
PHYSICAL EXAMINATION:
VITAL SIGNS:  Temperature 36.6, pulse 91, respirations 16, blood pressure 90/56.
HEART:  Regular rate and rhythm.
LUNGS:  Clear.
ABDOMEN:  Soft, positive bowel sounds in all 4 quadrants with some right mid to
right lower quadrant tenderness noted to palpation.
 
LABORATORY DATA:  Hemoglobin on admission was 6.8.  She has gotten 2 units of
blood, she is up to 8.8, hematocrit 20.4, white count was 13.9 and she is down
to 9.4 with platelets of 82.  Sodium 137, potassium 3, chloride 106, CO2 of 25,
BUN 12, creatinine 0.8, GFR 78, glucose is 96, total bilirubin 3.7, alkaline
phosphatase 177, ALT 33, AST is 57.  CT of the abdomen and pelvis showed small
to moderate ascites, cirrhosis.  She had circumferential wall thickening from
the cecum to the ascending colon.  Gallbladder was somewhat distended.
 
IMPRESSION:
1.  Abdominal pain.
2.  Fever.
3.  Abnormal CT on the right side.
4.  Leukocytosis.
5.  Thrombocytopenia.
6.  Alcoholic cirrhosis.
7.  History of alcohol abuse, quit in January of this year.
 
PLAN:
1.  Obtain a mesenteric arteriogram due to the right-sided abnormality on the
CT.
2.  Consider colonoscopy since she has never had and abnormal findings noted on
CT.
3.  CBC and CMP in the a.m.
4.  Further recommendations to be made once the above have been noted.
 
Thank you for allowing us to participate in this patient's care.  Please do not
hesitate to call with any questions in regard to this consult.
 
 
 
 
 
 
 
 
 
<ELECTRONICALLY SIGNED>
                                        By:  César Oleary MD            
05/28/18     0822
D: 05/18/18 1125_______________________________________
T: 05/18/18 2058César Oleary MD               /nt

## 2018-05-28 NOTE — CON
[address]  [address]                    CONSULTATION                  
_______________________________________________________________________________
 
Name:       LARA RAO             Room:           [address] [place]  
M.R.#:  [id number]      Account #:      [id number]  
Admission:  05/17/18     Attend Phys:    Franko Garcia MD 
Discharge:  05/21/18     Date of Birth:  [birth year]  
         Report #: [id number]
                                                                     [id number]  
_______________________________________________________________________________
THIS REPORT FOR:  //name//                      
 
CC: Franko Patel
 
DATE OF SERVICE:  05/18/2018
 
 
REASON FOR CONSULT:  Anemia and history of alcoholic cirrhosis.
 
CONSULT NUMBER:  [id number]
 
ADDENDUM:
The patient who is known to me, as she has history of alcoholic cirrhosis and
esophageal varices.  I had scoped her back in January of 2018 when she had
evidence of portal hypertensive gastropathy and grade F1 varices.  She presents
with acute over chronic anemia with hemoglobin of 6.8.  She also complains of
some abdominal pain.  CT of abdomen and pelvis revealed circumferential
thickening of cecum and ascending colon.  She also has leukocytosis.  Her
thrombocytopenia is associated with her liver disease and has been chronic.  Her
bilirubin is 3.7.  She is currently on antibiotic for suspected colonic
ischemia.  This is right-sided, I will consider a mesenteric angiogram to
further evaluate her colon and mesentery.  We will consider a colonoscopy prior
to discharge to further evaluate her abnormal CT.  The patient is agreeable with
plan.
 
 
 
 
 
 
 
 
 
 
 
 
 
 
 
 
 
 
 
<ELECTRONICALLY SIGNED>
                                        By:  César Oleary MD            
05/28/18     0822
D: 05/18/18 1257_______________________________________
T: 05/18/18 2236Farjenna Oleary MD               /nt

## 2019-01-09 ENCOUNTER — HOSPITAL ENCOUNTER (INPATIENT)
Dept: HOSPITAL 96 - M.ERS | Age: 44
LOS: 2 days | Discharge: HOME | DRG: 433 | End: 2019-01-11
Attending: HOSPITALIST | Admitting: HOSPITALIST
Payer: COMMERCIAL

## 2019-01-09 VITALS — SYSTOLIC BLOOD PRESSURE: 113 MMHG | DIASTOLIC BLOOD PRESSURE: 67 MMHG

## 2019-01-09 VITALS — SYSTOLIC BLOOD PRESSURE: 135 MMHG | DIASTOLIC BLOOD PRESSURE: 73 MMHG

## 2019-01-09 VITALS — SYSTOLIC BLOOD PRESSURE: 117 MMHG | DIASTOLIC BLOOD PRESSURE: 68 MMHG

## 2019-01-09 VITALS — WEIGHT: 186 LBS | HEIGHT: 62.99 IN | BODY MASS INDEX: 32.96 KG/M2

## 2019-01-09 VITALS — DIASTOLIC BLOOD PRESSURE: 52 MMHG | SYSTOLIC BLOOD PRESSURE: 113 MMHG

## 2019-01-09 VITALS — SYSTOLIC BLOOD PRESSURE: 114 MMHG | DIASTOLIC BLOOD PRESSURE: 76 MMHG

## 2019-01-09 DIAGNOSIS — F41.9: ICD-10-CM

## 2019-01-09 DIAGNOSIS — E44.0: ICD-10-CM

## 2019-01-09 DIAGNOSIS — Z79.899: ICD-10-CM

## 2019-01-09 DIAGNOSIS — I85.10: ICD-10-CM

## 2019-01-09 DIAGNOSIS — Z90.49: ICD-10-CM

## 2019-01-09 DIAGNOSIS — K64.4: ICD-10-CM

## 2019-01-09 DIAGNOSIS — R65.10: ICD-10-CM

## 2019-01-09 DIAGNOSIS — K70.30: Primary | ICD-10-CM

## 2019-01-09 DIAGNOSIS — K31.89: ICD-10-CM

## 2019-01-09 DIAGNOSIS — F12.90: ICD-10-CM

## 2019-01-09 DIAGNOSIS — D63.8: ICD-10-CM

## 2019-01-09 DIAGNOSIS — K76.6: ICD-10-CM

## 2019-01-09 DIAGNOSIS — E87.6: ICD-10-CM

## 2019-01-09 DIAGNOSIS — I10: ICD-10-CM

## 2019-01-09 LAB
ABSOLUTE BASOPHILS: 0 THOU/UL (ref 0–0.2)
ABSOLUTE EOSINOPHILS: 0.2 THOU/UL (ref 0–0.7)
ABSOLUTE MONOCYTES: 0.4 THOU/UL (ref 0–1.2)
ALBUMIN SERPL-MCNC: 2.5 G/DL (ref 3.4–5)
ALP SERPL-CCNC: 178 U/L (ref 46–116)
ALT SERPL-CCNC: 22 U/L (ref 30–65)
ANION GAP SERPL CALC-SCNC: 7 MMOL/L (ref 7–16)
APTT BLD: 28 SECONDS (ref 25–31.3)
AST SERPL-CCNC: 35 U/L (ref 15–37)
BACTERIA-REFLEX: (no result) /HPF
BASOPHILS NFR BLD AUTO: 0.7 %
BILIRUB SERPL-MCNC: 0.8 MG/DL
BILIRUB UR-MCNC: NEGATIVE MG/DL
BUN SERPL-MCNC: 9 MG/DL (ref 7–18)
CALCIUM SERPL-MCNC: 8.3 MG/DL (ref 8.5–10.1)
CHLORIDE SERPL-SCNC: 105 MMOL/L (ref 98–107)
CO2 SERPL-SCNC: 27 MMOL/L (ref 21–32)
COLOR UR: YELLOW
CREAT SERPL-MCNC: 0.6 MG/DL (ref 0.6–1.3)
EOSINOPHIL NFR BLD: 3.9 %
GLUCOSE SERPL-MCNC: 100 MG/DL (ref 70–99)
GRANULOCYTES NFR BLD MANUAL: 69.1 %
HCT VFR BLD CALC: 20.2 % (ref 37–47)
HGB BLD-MCNC: 6.7 GM/DL (ref 12–15)
INR PPP: 1.3
IRON SERPL-MCNC: 45 UG/DL (ref 50–175)
KETONES UR STRIP-MCNC: NEGATIVE MG/DL
LYMPHOCYTES # BLD: 0.8 THOU/UL (ref 0.8–5.3)
LYMPHOCYTES NFR BLD AUTO: 17.5 %
MCH RBC QN AUTO: 29.8 PG (ref 26–34)
MCHC RBC AUTO-ENTMCNC: 33.1 G/DL (ref 28–37)
MCV RBC: 90 FL (ref 80–100)
MONOCYTES NFR BLD: 8.8 %
MPV: 8.6 FL. (ref 7.2–11.1)
MUCUS: (no result) STRN/LPF
NEUTROPHILS # BLD: 3.2 THOU/UL (ref 1.6–8.1)
NUCLEATED RBCS: 0 /100WBC
PLATELET COUNT*: 114 THOU/UL (ref 150–400)
POTASSIUM SERPL-SCNC: 3.2 MMOL/L (ref 3.5–5.1)
PROT SERPL-MCNC: 5.9 G/DL (ref 6.4–8.2)
PROT UR QL STRIP: NEGATIVE
PROTHROMBIN TIME: 13.1 SECONDS (ref 9.2–11.5)
RBC # BLD AUTO: 2.25 MIL/UL (ref 4.2–5)
RBC # UR STRIP: (no result) /UL
RBC #/AREA URNS HPF: (no result) /HPF (ref 0–2)
RDW-CV: 14.9 % (ref 10.5–14.5)
SAO2 % BLD FROM PO2: 11 % (ref 20–39)
SODIUM SERPL-SCNC: 139 MMOL/L (ref 136–145)
SP GR UR STRIP: 1.01 (ref 1–1.03)
SQUAMOUS: (no result) /LPF (ref 0–3)
TROPONIN-I LEVEL: <0.06 NG/ML (ref ?–0.06)
URINE CLARITY: CLEAR
URINE GLUCOSE-RANDOM: NEGATIVE
URINE LEUKOCYTES-REFLEX: (no result)
URINE NITRITE-REFLEX: NEGATIVE
URINE WBC-REFLEX: (no result) /HPF (ref 0–5)
UROBILINOGEN UR STRIP-ACNC: 0.2 E.U./DL (ref 0.2–1)
WBC # BLD AUTO: 4.6 THOU/UL (ref 4–11)

## 2019-01-09 PROCEDURE — 30233N1 TRANSFUSION OF NONAUTOLOGOUS RED BLOOD CELLS INTO PERIPHERAL VEIN, PERCUTANEOUS APPROACH: ICD-10-PCS | Performed by: HOSPITALIST

## 2019-01-09 NOTE — PROC
[address]  [address]                    PROCEDURE REPORT              
_______________________________________________________________________________
 
Name:       LARA RAO             Room:           [address] [place]  
.R.#:  [id number]      Account #:      [id number]  
Admission:  01/09/19     Attend Phys:    Akosua Lee MD    
Discharge:  01/11/19     Date of Birth:  [birth year]  
         Report #: [id number]
                                                                                
_______________________________________________________________________________
THIS REPORT FOR:  //name//                      
 
For GI report, please see the Provation report in Perceptive 7 content.
 
 
 
 
 
 
 
 
 
 
 
 
 
 
 
 
 
 
 
 
 
 
 
 
 
 
 
 
 
 
 
 
 
 
 
 
 
 
 
 
 
                       
                                        By:                                
                 
D: 01/11/19     _______________________________________
T: 01/15/19 0653Medical Records Staff GERALD       /AL

## 2019-01-09 NOTE — CON
[address]  [address]                    CONSULTATION                  
_______________________________________________________________________________
 
Name:       LARA RAO             Room:           [address] [place]  
.R.#:  [id number]      Account #:      [id number]  
Admission:  01/09/19     Attend Phys:    Akosua Lee MD    
Discharge:               Date of Birth:  [birth year]  
         Report #: [id number]
                                                                     [id number]  
_______________________________________________________________________________
THIS REPORT FOR:  //name//                      
 
CC: Aviva Lee
 
HISTORY OF PRESENT ILLNESS:  This is a pleasant 43-year-old female with past
medical history significant for cirrhosis and varices who was sent to the ER by
her primary care physician for symptomatic anemia that was detected on routine
lab work.  The patient reports progressively worsening fatigue over the last few
weeks including shortness of breath and dizziness on exertion.  The patient had
similar episode last year when she presented with anemia and had EGD and
colonoscopy and video capsule endoscopy performed, both of which were
unremarkable.  However, her last hemoglobin about 7 months back was noted to be
normal.  The patient reports that she quit drinking over a year ago and denies
any hematemesis.  The patient does report dark stools intermittently over the
last few days.
 
PAST MEDICAL HISTORY:  Significant for hypertension, alcoholic cirrhosis.
 
PAST SURGICAL HISTORY:  The patient has history of appendectomy, tonsillectomy,
and breast augmentation.
 
SOCIAL HISTORY:  As mentioned before, the patient had a history of heavy alcohol
abuse, but she quit drinking about 1 year back.  She denies smoking or
recreational drug use.
 
FAMILY HISTORY:  There is no family history of colon cancer or liver disease.
 
REVIEW OF SYSTEMS:  A comprehensive 10-point review of systems is negative
except for what was mentioned in the HPI.
 
PHYSICAL EXAMINATION:
VITAL SIGNS:  Temperature 36.9, pulse rate 83, respirations 18, blood pressure
124/60, pulse ox 100%.
GENERAL:  The patient is alert, awake, oriented x3.
HEENT:  Pupils are equal, round, reactive to light and accommodation.  Mucous
membranes are moist.  There is no congestion.  There is no significant scleral
icterus and there is no asterixis.  Scattered spider angiomata are seen over the
upper extremities.
LUNGS:  Clear to auscultation bilaterally.
CARDIOVASCULAR:  Rate and rhythm regular.  S1, S2 present.
ABDOMEN:  Soft.  There is no distention, guarding or rigidity.
EXTREMITIES:  Warm, well perfused.  There is no edema.
 
LABORATORY DATA:  Hemoglobin on presentation was 6.7 and was 7.5 after
transfusion of 1 unit, platelet count 105, WBC count 4.7.  [place] 1[place]3.  [place] [address]                    CONSULTATION                  
_______________________________________________________________________________
 
Name:       LARA RAO             Room:           [address]#:  [id number]      Account #:      [id number]  
Admission:  01/09/19     Attend Phys:    Akosua Lee MD    
Discharge:               Date of Birth:  [birth year]  
         Report #: [id number]
                                                                     [id number]  
_______________________________________________________________________________
139, potassium 3.9, chloride 107, bicarbonate 22, BUN 7, creatinine 0.6, total
bilirubin 1.2, AST 33, ALT 18, alkaline phosphatase 145, albumin 2.3.
 
ASSESSMENT AND PLAN:  This is a pleasant 43-year-old female with past medical
history significant for hypertension and alcoholic liver disease who is
presenting for evaluation of symptomatic anemia.  The patient reports some dark
tarry stools over the last few days.  Denies any hematemesis or mesfin
hematochezia.  I will proceed with esophagogastroduodenoscopy today and make
further recommendations based on the esophagogastroduodenoscopy.
 
 
 
 
 
 
 
 
 
 
 
 
 
 
 
 
 
 
 
 
 
 
 
 
 
 
 
 
 
 
 
 
 
 
 
                       
                                        By:                                
                 
D: 01/10/19 1817_______________________________________
T: 01/10/19 2227Shaun Chatman MD            /nt

## 2019-01-10 VITALS — SYSTOLIC BLOOD PRESSURE: 116 MMHG | DIASTOLIC BLOOD PRESSURE: 72 MMHG

## 2019-01-10 VITALS — DIASTOLIC BLOOD PRESSURE: 60 MMHG | SYSTOLIC BLOOD PRESSURE: 124 MMHG

## 2019-01-10 VITALS — SYSTOLIC BLOOD PRESSURE: 111 MMHG | DIASTOLIC BLOOD PRESSURE: 63 MMHG

## 2019-01-10 VITALS — SYSTOLIC BLOOD PRESSURE: 106 MMHG | DIASTOLIC BLOOD PRESSURE: 60 MMHG

## 2019-01-10 VITALS — DIASTOLIC BLOOD PRESSURE: 79 MMHG | SYSTOLIC BLOOD PRESSURE: 126 MMHG

## 2019-01-10 VITALS — DIASTOLIC BLOOD PRESSURE: 63 MMHG | SYSTOLIC BLOOD PRESSURE: 121 MMHG

## 2019-01-10 VITALS — SYSTOLIC BLOOD PRESSURE: 109 MMHG | DIASTOLIC BLOOD PRESSURE: 52 MMHG

## 2019-01-10 LAB
ABSOLUTE BASOPHILS: 0 THOU/UL (ref 0–0.2)
ABSOLUTE EOSINOPHILS: 0.1 THOU/UL (ref 0–0.7)
ABSOLUTE MONOCYTES: 0.5 THOU/UL (ref 0–1.2)
ALBUMIN SERPL-MCNC: 2.3 G/DL (ref 3.4–5)
ALBUMIN SERPL-MCNC: 2.3 G/DL (ref 3.4–5)
ALP SERPL-CCNC: 145 U/L (ref 46–116)
ALP SERPL-CCNC: 156 U/L (ref 46–116)
ALT SERPL-CCNC: 18 U/L (ref 30–65)
ALT SERPL-CCNC: 18 U/L (ref 30–65)
ANION GAP SERPL CALC-SCNC: 10 MMOL/L (ref 7–16)
ANION GAP SERPL CALC-SCNC: 9 MMOL/L (ref 7–16)
AST SERPL-CCNC: 33 U/L (ref 15–37)
AST SERPL-CCNC: 35 U/L (ref 15–37)
BASOPHILS NFR BLD AUTO: 1 %
BILIRUB SERPL-MCNC: 1.1 MG/DL
BILIRUB SERPL-MCNC: 1.2 MG/DL
BUN SERPL-MCNC: 7 MG/DL (ref 7–18)
BUN SERPL-MCNC: 8 MG/DL (ref 7–18)
CALCIUM SERPL-MCNC: 8.1 MG/DL (ref 8.5–10.1)
CALCIUM SERPL-MCNC: 8.1 MG/DL (ref 8.5–10.1)
CHLORIDE SERPL-SCNC: 107 MMOL/L (ref 98–107)
CHLORIDE SERPL-SCNC: 107 MMOL/L (ref 98–107)
CO2 SERPL-SCNC: 22 MMOL/L (ref 21–32)
CO2 SERPL-SCNC: 23 MMOL/L (ref 21–32)
CREAT SERPL-MCNC: 0.5 MG/DL (ref 0.6–1.3)
CREAT SERPL-MCNC: 0.6 MG/DL (ref 0.6–1.3)
EOSINOPHIL NFR BLD: 3.2 %
GLUCOSE SERPL-MCNC: 77 MG/DL (ref 70–99)
GLUCOSE SERPL-MCNC: 86 MG/DL (ref 70–99)
GRANULOCYTES NFR BLD MANUAL: 69.8 %
HCT VFR BLD CALC: 22.7 % (ref 37–47)
HGB BLD-MCNC: 7.5 GM/DL (ref 12–15)
LYMPHOCYTES # BLD: 0.8 THOU/UL (ref 0.8–5.3)
LYMPHOCYTES NFR BLD AUTO: 16.3 %
MAGNESIUM SERPL-MCNC: 1.5 MG/DL (ref 1.8–2.4)
MCH RBC QN AUTO: 29.5 PG (ref 26–34)
MCHC RBC AUTO-ENTMCNC: 33 G/DL (ref 28–37)
MCV RBC: 89.3 FL (ref 80–100)
MONOCYTES NFR BLD: 9.7 %
MPV: 9.2 FL. (ref 7.2–11.1)
NEUTROPHILS # BLD: 3.3 THOU/UL (ref 1.6–8.1)
NUCLEATED RBCS: 0 /100WBC
PLATELET COUNT*: 105 THOU/UL (ref 150–400)
POTASSIUM SERPL-SCNC: 3.3 MMOL/L (ref 3.5–5.1)
POTASSIUM SERPL-SCNC: 3.9 MMOL/L (ref 3.5–5.1)
PROT SERPL-MCNC: 5.5 G/DL (ref 6.4–8.2)
PROT SERPL-MCNC: 5.5 G/DL (ref 6.4–8.2)
RBC # BLD AUTO: 2.54 MIL/UL (ref 4.2–5)
RDW-CV: 15 % (ref 10.5–14.5)
SODIUM SERPL-SCNC: 139 MMOL/L (ref 136–145)
SODIUM SERPL-SCNC: 139 MMOL/L (ref 136–145)
WBC # BLD AUTO: 4.7 THOU/UL (ref 4–11)

## 2019-01-10 PROCEDURE — 0DJ08ZZ INSPECTION OF UPPER INTESTINAL TRACT, VIA NATURAL OR ARTIFICIAL OPENING ENDOSCOPIC: ICD-10-PCS | Performed by: INTERNAL MEDICINE

## 2019-01-10 NOTE — NUR
gi consult called at 0325 to [facility] gastrointerology, spoke to
Ana, Dr. Melara expected to consult with this patient 01/10/19

## 2019-01-10 NOTE — NUR
ASSESSMENT COMPLETE. PT ALERT AND ORIENTED X4. PT DENIES PAIN AND N/V. PT NPO
THIS AM. EGD DONE WITH DR FISCHER AT NOON. PT HAS COLONOSCOPY TOMORROW, PREP
STARTED. PT IS ON CLEAR LIQUIDS, NPO AFTER MIDNIGHT. CONSENT SIGNED IN CHART.
PT HAS IV IN RIGHT AC, SALINE LOCKED. PT IS ON ROOM AIR WITH [place]QAUTE SATS.
VSS. OCCULT BLOOD NEGATIVE. PT IS UP AD GARRICK WITH STEADY GAIT. SEE ASSESSMENT
AND VITALS FOR OTHER DETAILS. CALL LIGHT WITHIN REACH, WILL CONTINUE PLAN OF
CARE

## 2019-01-10 NOTE — NUR
pt arrived 1/9/19 at 2120, admitted to room 305, pt ambulates from stretcher
in hallway to bed without difficulty, pt does report feeling dizzy when
standing, encouraged to call out prior to moving about the room, pt introduced
to surroundings, assessment complete, pt denies pain, denies n/v, blood
transfusion administered as ordered, pt received po meds without difficulty,
refuses SCDs, pt complains of feeling anxious and cannot sleep, new orders
received from dr sahni for antianxiety medication, pt now resting quietly in
bed, no s/s acute distress, pt has not stooled since admission

## 2019-01-10 NOTE — EKG
[place] [address]
Phone:  [phone]                     ELECTROCARDIOGRAM REPORT      
_______________________________________________________________________________
 
Name:       LARA RAO             Room:           [address]    ADM IN  
M.R.#:  [id number]      Account #:      [id number]  
Admission:  19     Attend Phys:    Akosua Lee MD    
Discharge:               Date of Birth:  [birth year]  
         Report #: [id number]
    [id number]
_______________________________________________________________________________
THIS REPORT FOR:  //name//                      
 
                         [facility] ED
                                       
Test Date:    2019               Test Time:    18:54:33
Pat Name:     LARA RAO         Department:   
Patient ID:   [id number]            Room:         [facility]
Gender:       F                        Technician:   [facility]
:          1975               Requested By: Debra Noguera
Order Number: [id number] MD:   Gustavo Mcdaniel
                                 Measurements
Intervals                              Axis          
Rate:         76                       P:            13
CO:           142                      QRS:          -3
QRSD:         91                       T:            51
QT:           421                                    
QTc:          474                                    
                           Interpretive Statements
Sinus rhythm
Possible anterior scar
Ventricular premature complex
Compared to ECG 2018 16:14:12
Ventricular premature complex(es) now present
Sinus tachycardia no longer present
Myocardial infarct finding persists
Electronically Signed On 1- 16:34:29 CST by Gustavo Mcdaniel
[email or web address]
 
 
 
 
 
 
 
 
 
 
 
 
 
 
 
 
  <ELECTRONICALLY SIGNED>
                                           By: Gustavo Mcdaniel MD, [facility]     
  01/10/19     1634
D: 19 1854   _____________________________________
T: 19 1854   Gustavo Mcdaniel MD, [facility]       /EPI

## 2019-01-11 VITALS — DIASTOLIC BLOOD PRESSURE: 59 MMHG | SYSTOLIC BLOOD PRESSURE: 108 MMHG

## 2019-01-11 VITALS — SYSTOLIC BLOOD PRESSURE: 120 MMHG | DIASTOLIC BLOOD PRESSURE: 76 MMHG

## 2019-01-11 VITALS — SYSTOLIC BLOOD PRESSURE: 121 MMHG | DIASTOLIC BLOOD PRESSURE: 63 MMHG

## 2019-01-11 VITALS — SYSTOLIC BLOOD PRESSURE: 103 MMHG | DIASTOLIC BLOOD PRESSURE: 70 MMHG

## 2019-01-11 LAB
ANION GAP SERPL CALC-SCNC: 9 MMOL/L (ref 7–16)
BUN SERPL-MCNC: 5 MG/DL (ref 7–18)
CALCIUM SERPL-MCNC: 7.6 MG/DL (ref 8.5–10.1)
CHLORIDE SERPL-SCNC: 109 MMOL/L (ref 98–107)
CO2 SERPL-SCNC: 24 MMOL/L (ref 21–32)
CREAT SERPL-MCNC: 0.5 MG/DL (ref 0.6–1.3)
GLUCOSE SERPL-MCNC: 94 MG/DL (ref 70–99)
HCT VFR BLD CALC: 23.2 % (ref 37–47)
HGB BLD-MCNC: 7.7 GM/DL (ref 12–15)
MAGNESIUM SERPL-MCNC: 1.3 MG/DL (ref 1.8–2.4)
MCH RBC QN AUTO: 29.5 PG (ref 26–34)
MCHC RBC AUTO-ENTMCNC: 33 G/DL (ref 28–37)
MCV RBC: 89.4 FL (ref 80–100)
MPV: 9 FL. (ref 7.2–11.1)
PLATELET COUNT*: 103 THOU/UL (ref 150–400)
POTASSIUM SERPL-SCNC: 3.3 MMOL/L (ref 3.5–5.1)
RBC # BLD AUTO: 2.59 MIL/UL (ref 4.2–5)
RDW-CV: 15.4 % (ref 10.5–14.5)
SODIUM SERPL-SCNC: 142 MMOL/L (ref 136–145)
WBC # BLD AUTO: 3.8 THOU/UL (ref 4–11)

## 2019-01-11 PROCEDURE — 0DJD8ZZ INSPECTION OF LOWER INTESTINAL TRACT, VIA NATURAL OR ARTIFICIAL OPENING ENDOSCOPIC: ICD-10-PCS | Performed by: INTERNAL MEDICINE

## 2019-01-11 NOTE — NUR
ASSUMED PATIENT CARE AT 1900.  NO COMPLAINTS OF PAIN OR DISCOMFORT NOTED.
ALERT AND ORIENTED TIMES FOUR.  RN ASSESSMENT AND HOURLY ROUNDING COMPLETED AS
DOCUMENTED.  NPO SINCE MIDNIGHT

## 2019-01-11 NOTE — NUR
ASSESSMENT COMPLETE. PT HAD COLONOSCOPY WITH DR WALLIS TODAY AND HE CLEARED TO DC
HOME. PT WILL HAVE FOLLOW UP SB STUDY WITH HIS OFFICE. DR MONROE NOTIFIED AND
WROTE DC ORDERS. VSS. PT DENIES PAIN AND N/V. IV DISCONTINUED. PT LEFT WITH
SPOOUSE. DC INSTRUCTIONS GIVEN, PT VERBALIZES UNDERSTANDING.

## 2019-02-26 NOTE — EKG
[place] [address]
Phone:  [phone]                     ELECTROCARDIOGRAM REPORT      
_______________________________________________________________________________
 
Name:       LARA RAO             Room:                      [facility]#:  [id number]      Account #:      [id number]  
Admission:  19     Attend Phys:                         
Discharge:  19     Date of Birth:  [birth year]  
         Report #: [id number]
    [id number]
_______________________________________________________________________________
THIS REPORT FOR:  //name//                      
 
                          [facility]
                                       
Test Date:    2019               Test Time:    12:28:17
Pat Name:     LARA RAO         Department:   
Patient ID:   [id number]            Room:          
Gender:       F                        Technician:   ALFONZO
:          1975               Requested By: Zoë Forbes
Order Number: [id number] MD:   Cesar Valverde
                                 Measurements
Intervals                              Axis          
Rate:         65                       P:            -4
MS:           135                      QRS:          -3
QRSD:         88                       T:            40
QT:           464                                    
QTc:          483                                    
                           Interpretive Statements
Sinus rhythm
Probable left atrial enlargement
Anterior infarct, old
Compared to ECG 2019 18:54:33
 
Ventricular premature complex(es) no longer present
 
Electronically Signed On 2019 14:28:14 CST by Cesar Valverde
[email or web address]
 
 
 
 
 
 
 
 
 
 
 
 
 
 
 
 
  <ELECTRONICALLY SIGNED>
                                           By: Cesar Valverde MD, [facility]      
  19     1428
D: 19 1228   _____________________________________
T: 19 1228   Cesar Valverde MD, [facility]        /EPI

## 2019-07-08 NOTE — EKG
[place] [address]
Phone:  [phone]                     ELECTROCARDIOGRAM REPORT      
_______________________________________________________________________________
 
Name:       LARA RAO         Room:                      [facility]  
M.R.#:  [id number]      Account #:      [id number]  
Admission:  19     Attend Phys:                         
Discharge:               Date of Birth:  [birth year]  
         Report #: [id number]
    [id number]
_______________________________________________________________________________
THIS REPORT FOR:  //name//                      
 
                         [facility] ED
                                       
Test Date:    2019               Test Time:    13:46:11
Pat Name:     LARA RAO         Department:   
Patient ID:   [id number]            Room:          
Gender:       F                        Technician:   
:          1975               Requested By: Mady Kinsey
Order Number: [id number] MD:   Cesar Valverde
                                 Measurements
Intervals                              Axis          
Rate:         70                       P:            13
ME:           144                      QRS:          -10
QRSD:         100                      T:            26
QT:           425                                    
QTc:          459                                    
                           Interpretive Statements
Sinus rhythm
Inferior infarct, old
Compared to ECG 2019 12:28:17
No significant changes
 
Electronically Signed On 2019 14:20:29 CDT by Cesar Valverde
[email or web address]
 
 
 
 
 
 
 
 
 
 
 
 
 
 
 
 
 
 
  <ELECTRONICALLY SIGNED>
                                           By: Cesar Valverde MD, [facility]      
  19     1420
D: 19 1346   _____________________________________
T: 196   Cesar Valverde MD, FACC        /EPI

## 2019-08-12 NOTE — OP
[address]  [address]                    OPERATIVE REPORT              
_______________________________________________________________________________
 
Name:       LARA RAO         Room:                      [facility]#:  [id number]      Account #:      [id number]  
Admission:  08/12/19     Attend Phys:    Fredo Argueta DO   
Discharge:               Date of Birth:  [birth year]  
         Report #: [id number]
                                                                     [id number]  
_______________________________________________________________________________
THIS REPORT FOR:  //name//                      
 
CC: Fredo Silverio DO
 
DATE OF SERVICE:  08/12/2019
 
 
REFERRING PHYSICIAN:  Aviva Patel DO and Tong Jimenez DO
 
PREOPERATIVE DIAGNOSES:  Gallbladder polyp, right upper quadrant pain, known
alcoholic, hepatitis, and cirrhosis.
 
POSTOPERATIVE DIAGNOSES:  Gallbladder polyp, right upper quadrant pain, known
alcoholic, hepatitis, and cirrhosis.
 
PROCEDURE:  Laparoscopic cholecystectomy with intraoperative cholangiogram and
liver biopsy x 3.
 
SURGEON:  Fredo Argueta DO
 
FIRST ASSISTANT:  Dr. Sofía Chino.
 
SECOND ASSISTANT:  Student, Dr. Duy White, MS3
 
ANESTHESIA:  General endotracheal.
 
ESTIMATED BLOOD LOSS:  Less than 50 mL.
 
COMPLICATIONS:  None.
 
DESCRIPTION OF PROCEDURE:  After obtaining proper consents and discussing risks
and complications with the patient, she was taken to the operating room, laid in
the supine position, administered general anesthesia.  She was then prepped and
draped in the usual sterile fashion.  A timeout was performed.  We confirmed the
appropriate patient and procedure.  Preoperative antibiotics were given.  SCDs
were in place.  We then made a small supraumbilical skin incision with a #11
scalpel blade.  This was carried down through the skin into the subcutaneous
tissue using electrocautery for hemostasis.  Once the fascia was encountered, it
was incised along the midline, grasped and elevated with Kocher clamps and
divided further.  The peritoneum was then bluntly opened using a hemostat.  A
finger was placed inside the peritoneal cavity to assure that there were no
isidra-incisional adhesions.  Next, 2-0 Vicryl sutures were placed in a
figure-of-eight fashion to secure the Jarad trocar, which was then inserted and
 
 
 
[place] [address]                    OPERATIVE REPORT              
_______________________________________________________________________________
 
Name:       LARA ROA         Room:                      [facility].#:  [id number]      Account #:      [id number]  
Admission:  08/12/19     Attend Phys:    Fredo Argueta DO   
Discharge:               Date of Birth:  [birth year]  
         Report #: [id number]
                                                                     [id number]  
_______________________________________________________________________________
insufflation was begun.  Once insufflation was complete, full visual inspection
of the anterior abdominal organs was performed.  This revealed a very nodular
cirrhotic appearing liver.  The left lobe of the liver was quite large.  The
right lobe of the liver was contracted and did have evidence of some adhesions
of the liver to the diaphragm and abdominal wall.  We were not immediately able
to visualize the gallbladder.  At this point, we then placed the patient in
reverse Trendelenburg position and rotated her to the left.  In doing so, we
were able to then visualize the gallbladder, which appeared thick walled and
somewhat atonic.  We then placed an 11 mm trocar to the right of midline and
then two more 5 mm trocars on the right side.  We were then able to grasp and
elevate the gallbladder.  There were numerous omental adhesions to the
gallbladder, which were taken down.  The left lobe of the liver was also quite
problematic and that it kept on lying over top of the gallbladder, so at this
point we elected to place another trocar in the patient's left upper quadrant
and used a fan retractor to hold the left lobe of the liver out of the way. 
Once this was done, I was able to take down the adhesions all the way to
Thelma's pouch.  We then used immunofluorescence imaging in order to visualize
the common hepatic duct, common bile duct, and the cystic duct before any
dissection of the hepatoduodenal ligament was performed.  Once this was done, I
was able to strip down the hepatoduodenal ligament until we were able to
visualize the cystic duct as it coursed directly into the gallbladder.  This was
dissected free and skeletonized in order to perform a cholangiogram.  I then
placed a clip at the gallbladder cystic duct junction.  Cholangiogram catheter
was inserted through a 14-gauge Angiocath in the abdominal wall.  We then made a
small nick in the cystic duct and cholangiography was performed.  Initially, I
was unable to get contrast to go up into the common hepatic duct and the hepatic
radicles, however, after administering the contrast with more pressure, I was
able to get this to go up.  We were able to visualize the cystic duct, common
hepatic duct, and intrahepatic ducts both the right and left, as well as the
hepatic radicles.  We also could visualize the common bile duct and there was
contrast that did go into the duodenum without any signs of filling defects or
obstruction.  At this point, we removed the cholangiogram catheter and then
placed 4 clips proximally on the cystic duct.  The cystic duct was then divided.
 There was a very large cystic duct lymph node.  This was dissected free and
then capped with the gallbladder.  The cystic artery was then identified.  It
was clipped proximally and distally and then divided between clips.  The
gallbladder was then removed from the liver bed using electrocautery.  As I was
doing this, there was some bleeding from the liver bed as to be expected with a
bad cirrhotic liver, so we cauterized this initially.  We then placed the
gallbladder into an Endopouch.  I then copiously irrigated to assure hemostasis
and as a precaution, I did place a piece of Surgicel in the gallbladder fossa. 
We then turned our attention to the planned liver biopsy, which was performed
using a Monopty percutaneous liver biopsy needle, which was inserted through the
abdominal wall.  Three random liver biopsies were taken of the patient's left
lobe of the liver and these were immediately cauterized afterwards and again as
a precaution, we placed a piece of Surgicel over the liver biopsy sites as well.
 
 
 
[place] [address]                    OPERATIVE REPORT              
_______________________________________________________________________________
 
Name:       LARA RAO         Room:                      [facility]#:  [id number]      Account #:      [id number]  
Admission:  08/12/19     Attend Phys:    Fredo Argueta DO   
Discharge:               Date of Birth:  [birth year]  
         Report #: [id number]
                                                                     [id number]  
_______________________________________________________________________________
 We then stopped the insufflation.  All instruments and trocars were removed
under direct vision.  The gallbladder was removed through the umbilical incision
in an Endopouch.  We then closed the umbilical fascia using the 2 previously
placed 0 Vicryl sutures plus 2 additional 0 Vicryl suture.  Skin incisions were
all closed using 4-0 Monocryl subcuticular stitches.  The wounds were injected
with 0.5% Marcaine without epinephrine and then sterile dressings were placed. 
The patient was awakened in the operating room.  Sponge, needle, and instrument
counts were all correct at the end of the procedure x 3.
 
 
 
 
 
 
 
 
 
 
 
 
 
 
 
 
 
 
 
 
 
 
 
 
 
 
 
 
 
 
 
 
 
 
 
 
                       
                                        By:                                
                 
D: 08/12/19 1003_______________________________________
T: 08/12/19 1103Aaudrey Argueta DO               /nt

## 2019-08-13 NOTE — EKG
[place] [address]
Phone:  [phone]                     ELECTROCARDIOGRAM REPORT      
_______________________________________________________________________________
 
Name:       MAIRALARA CANELA         Room:                      [facility]#:  [id number]      Account #:      [id number]  
Admission:  19     Attend Phys:    Fredo Argueta DO   
Discharge:               Date of Birth:  [birth year]  
         Report #: [id number]
    [id number]
_______________________________________________________________________________
THIS REPORT FOR:  //name//                      
 
                          [facility]
                                       
Test Date:    2019               Test Time:    07:38:21
Pat Name:     LARA RAO         Department:   
Patient ID:   [id number]            Room:          
Gender:       F                        Technician:   
:          1975               Requested By: Fredo Argueta
Order Number: [id number]    Reading MD:   Matthew Alba
                                 Measurements
Intervals                              Axis          
Rate:         68                       P:            1
NE:           141                      QRS:          -5
QRSD:         91                       T:            50
QT:           427                                    
QTc:          455                                    
                           Interpretive Statements
Sinus rhythm
Compared to ECG 2019 13:46:11
Myocardial infarct finding no longer present
 
Electronically Signed On 2019 10:27:39 CDT by Matthew Alba
[email or web address]
 
 
 
 
 
 
 
 
 
 
 
 
 
 
 
 
 
 
 
  <ELECTRONICALLY SIGNED>
                                           By: Matthew Alba MD, [facility]   
  19     1027
D: 19 0738   _____________________________________
T: 19 07   Matthew Alba MD, FACC     /EPI

## 2019-08-20 NOTE — PATH
[address]  [address]                    PATHOLOGY RPT PROCEDURE       
_______________________________________________________________________________
 
Name:       LUMA ALVAREZ         Room:                      [facility]#:  [id number]      Account #:      [id number]  
Admission:  08/12/19     Date of Birth:  [birth year]  
Discharge:                             Report #:    [id number]
                                                         Path Case #: [id number]
_______________________________________________________________________________
 
[facility] Accession Number: [id number]
.                                                                01
Material submitted:                                        .
PART A: gallbladder - GALLBLADDER
PART B: liver - LEFT LOBE LIVER BIOPSIES. Modifiers: left
.                                                                01
Clinical history:                                          .
Cholecystitis, cirrhosis
.                                                                02
**********************************************************************
Diagnosis:
A. Gallbladder:
- Chronic cholecystitis and benign sentinel lymph node showing prominent
well-formed granulomata.  See comment.
.
B. Left lobe liver biopsies:
- Benign liver with cirrhosis and moderate chronic portal inflammation.
See comment.
LBQ/08/14/2019
**********************************************************************
.                                                                02
Comment:
Sections of the sentinel lymph node in association with the gallbladder
(A2) show abundant well-formed granulomata, and although not diagnostic,
are typical of those seen with sarcoidosis.  Properly controlled GMS and
Kinyoun stains are negative for fungal and mycobacterial organisms
although infectious possibilities are best excluded with cultures.  No
birefringent foreign material is seen when examined under cross polarized
microscopy.
.
Sections of the liver (B1) show nodules of liver parenchyma in association
with obvious bridging fibrosis/cirrhosis and without significant
steatosis.  A few areas show sinusoidal dilation in association with fresh
hemorrhage suggesting outflow obstruction.  Bile ducts are present within
portal tracts and there is moderate chronic inflammation also noted to be
associated with scattered neutrophils and eosinophils, without plasma
cells or granulomata seen.  Properly controlled special stains performed
on B1 show the following results:
.
PAS with and without diastase - No PAS positive diastase resistant
globules
Iron - No stainable increase
Reticulin and trichrome - Highlights cirrhosis
.
This case will be forwarded to the [facility], Department of
Hepatopathology in consultation and an addendum report will be issued.
(HANS/db; 8/14/2019)
.                                                                02
 
[place] [address]                    PATHOLOGY RPT PROCEDURE       
_______________________________________________________________________________
 
Name:       MAIRALUMA KING         Room:                      [facility]#:  [id number]      Account #:      [id number]  
Admission:  08/12/19     Date of Birth:  [birth year]  
Discharge:                             Report #:    [id number]
                                                         Path Case #: [id number]
_______________________________________________________________________________
Addendum:                                                       .
Special studies report received from [address] [address], on case -[id number], labeled with their number
[id number], dated 08/16/2019.
.
PATH
Pathology Consultation
.
Pathology Consult
.
Interpretation
.
FINAL DIAGNOSIS
Gallbladder, left lobe liver, cholecystectomy and biopsy
(-[id number]; 8/12/2019):
A. Gallbladder: Chronic cholecystitis. Benign lymph nodes with
non-necrotizing granulomatous inflammation. (See comment)
B. Liver, left lobe: Established cirrhosis (stage 4 of 4),
post-steatohepatitic. (See comment)
.
COMMENT
Thank you for sending this case for consultation, and I agree with your
assessment. The cholecystectomy specimen from the 44 -year-old female
patient shows chronic cholecystitis. As you have noticed, the cystic duct
lymph node contains numerous well-formed epithelioid non-necrotizing
granulomas with multinucleated giant cells. The submitted AFB and GMS
stains are negative for microorganisms. Morphologically, I think this is
most consistent with sarcoidosis. Clinical correlation is necessary.
.
In addition, the liver biopsy shows established cirrhosis, confirmed by
the submitted trichrome stain. The iron stain is negative. The PAS-D stain
is negative for intracytoplasmic globules in hepatocytes. No granulomas
are seen in the liver. Although the current specimen does not show
specific features for her chronic liver disease, her prior liver biopsy
(SMS- 2/1/2018; CR-), which was reviewed last year at our
institution, showed bridging fibrosis with focal nodular formation and
evidence of moderately active steatohepatitis.
.
Thank you for the opportunity to be involved in this case. Your material
is being returned. Please let us know of any pertinent follow-up. If you
have any questions, please do not hesitate to reach me at [phone].
.
.
Material Received
A. -[id number]: Gallbladder and left lobe liver 12
stained slides, 1 block
.
Report electronically signed by
Brad Thurman M.D.
 
[address]  [address]                    PATHOLOGY RPT PROCEDURE       
_______________________________________________________________________________
 
Name:       LUMA ALVAREZ         Room:                      [facility]#:  [id number]      Account #:      [id number]  
Admission:  08/12/19     Date of Birth:  [birth year]  
Discharge:                             Report #:    [id number]
                                                         Path Case #: [id number]
_______________________________________________________________________________
[id number]
.
I verify that I have examined all relevant slides/materials for the
specimen(s) and rendered or confirmed the diagnosis.
.
A complete copy of the report is on file.
.
Professional [place] [id number].  Technical services performed by [employer],
[facility] [address], [place] [address].
.
(HANS:amj 08/19/2019)
.
AZJ/08/20/2019
Addendum Electronically Signed by Boyd Sargent MD, Pathologist
.                                                                02
Electronically signed:                                     .
Boyd Sargent MD, Pathologist
NPI- [id number]
.                                                                01
Gross description:                                         .
A. The specimen is received in formalin labeled "Luma Alvarez,
gallbladder" and consists of an intact green-gray gallbladder measuring
9.5 cm in length and up to 4.0 cm in diameter.  The margin is inked black.
 Opening reveals a lumen filled with viscous green bile and no calculi.
The mucosa is tan-brown with an average wall thickness of 0.1 cm.  No
masses are identified.  Adjacent the gallbladder neck is a lymph node
candidate measuring 2.0 x 0.8 cm.  Representative sections are submitted
in A1-A2.
.
B. The specimen is received in formalin, labeled "Luma Alvarez, left
lobe liver biopsies" and consists of 3 tan needle cores measuring between
0.7 cm and 1.1 cm in length and 0.1 cm each in diameter which are entirely
submitted in B1.
(SDY; 8/12/2019)
TYREE/SYU
.                                                                02
Pathologist provided ICD-10:
K81.1, K74.60, K76.9
.                                                                02
CPT                                                        .
203591, 000324, 670477, 758713, 794408, 093190, 884232, 067079, 329331
Specimen Comment: A courtesy copy of this report has been sent to
Specimen Comment: [phone], [phone].
Specimen Comment: Report sent to  / DR BHAKTA
Specimen Comment: A duplicate report has been generated due to demographic
updates.
***Performed at:  01
   [address]  [address]                    PATHOLOGY RPT PROCEDURE       
_______________________________________________________________________________
 
Name:       LUMA ALVAREZ         Room:                      [facility]#:  [id number]      Account #:      [id number]  
Admission:  08/12/19     Date of Birth:  [birth year]  
Discharge:                             Report #:    [id number]
                                                         Path Case #: [id number]
_______________________________________________________________________________
   [address] [facility] [address], [place]  [id number]
   MD Gregor Calderón MD Phone:  [phone]
***Performed at:  02
   [facility]
   [address], [place]  [id number]
   MD Boyd Sargent MD Phone:  [phone]

## 2019-09-02 NOTE — NUR
ADMITTED TO ICU BED 6 AT 0400, SEE ASSESSMENTS.  PT REPORTS EMESIS CONTAINING
BRIGHT RED BLOOD AND DARK, TAR CONSISTENCY BM PRIOR TO ARRIVAL IN ED.  VSS.
PT DENIES PAIN, NAUSEA, SOA, AND OTHER DISCOMFORT AT THIS TIME.  PROTONIX GTT
AND IVF INFUSING AS ORDERED.  PT ORIENTED TO ROOM AND CALL LIGHT.  ALL
QUESTIONS ANSWERED.  CALL LIGHT WITHIN REACH.

## 2019-09-02 NOTE — NUR
PT TOLERATING CLEAR LIQUID DIET. PASSED A DARK STOOL x1. OCTREOTIDE CONT AT 50
MCG/HR, PROTONIX AT 8MG/HR AND NS  MLS/HR. VSS. NO COMPLAINTS OF PAIN.
NO NAUSEA OR VOMITING. RESTING WELL.

## 2019-09-03 NOTE — NUR
VSS,CARDIAC MONITOR IN PLACE WITH NO CHANGES.PT REMAINS ON ROOM
AIR.PROGRESSING TOWARDS GOALS.NO C/O PAIN.NO SIGNS OR SYMPTOMS OF BLEEDING
ALTHOUGH PT HAD SEVERAL DARK FORMED STOOLS THROUGHOUT SHIFT.IV PATENT WITH IVF
INFUSING PER ORDERS.PT ADVANCED AND TOLERATED DIET TO 2GM SODIUM.PT DOWN
GRADED TO TELEMETRY STATUS.PT INFORMED OF PLAN OF CARE AND COMMUNICATES
UNDERSTANDING.UA SENT.HOURLY ROUNDING COMPLETED FOR PT SAFETY.CALL LIGHT AND
FALL PRECAUTIONS IN PLACE.WILL CONTINUE TO MONITOR FOR DURATION OF SHIFT.

## 2019-09-03 NOTE — NUR
MET WITH PATIENT AND SPOUSE IN ROOM. PT HAS NO DPOA OR ADVANCE DIRECTIVE;
DECLINESS OFFERING OF ADDITIONAL INFORMATION. NO IDENTIFIED SERVICE NEEDS AT
THIS TIME. SPOUSE CONCERNED FOR PATINET RETURN TO WORK FOLLOWING RECENT LAP
CARIE AND NOW POSST GI BLEED. SPOUSE CONCERNED FOR PATIENT'S LEVEL OF STRESS
IN THE WORKPLACE.  PATIENT DOES NOT VOICE THESE CONCERNS DURING CONVERSATION.
RECOMMENDED PATIENT SPEAK WITH ATTENDING TO ADDRESS SPECIFIC CONCERNS IN HER
RETURN TO WORK PRIOR TO THIS HOSPITAL DISCHARGE.  NO OTHER CONCERNS IDENTIFIED
IN ASSESSMENT.

## 2019-09-03 NOTE — EKG
[place] [address]
Phone:  [phone]                     ELECTROCARDIOGRAM REPORT      
_______________________________________________________________________________
 
Name:       LARA RAO         Room:           [address]    [place]M [place]  
.R.#:  [id number]      Account #:      [id number]  
Admission:  19     Attend Phys:    Rafia Bermeo MD 
Discharge:               Date of Birth:  [birth year]  
         Report #: [id number]
    [id number]
_______________________________________________________________________________
THIS REPORT FOR:  //name//                      
 
                         [facility] ED
                                       
Test Date:    2019               Test Time:    02:17:02
Pat Name:     LARA RAO         Department:   
Patient ID:   [id number]            Room:         [facility]
Gender:       F                        Technician:   
:          1975               Requested By: Zoë Webb
Order Number: [id number] MD:   Gustavo Mcdaniel
                                 Measurements
Intervals                              Axis          
Rate:         111                      P:            12
WY:           125                      QRS:          11
QRSD:         79                       T:            60
QT:           342                                    
QTc:          465                                    
                           Interpretive Statements
Sinus tachycardia
Minimal ST depression, lateral leads
Compared to ECG 2019 07:38:21
ST (T wave) deviation now present
Sinus rate has increased
Electronically Signed On 9-3-2019 16:08:43 CDT by Gustavo Mcdaniel
[email or web address]
 
 
 
 
 
 
 
 
 
 
 
 
 
 
 
 
 
 
  <ELECTRONICALLY SIGNED>
                                           By: Gustavo Mcdaniel MD, FACC     
  19     1608
D: 19 0217   _____________________________________
T: 19 0217   Gustavo Mcdaniel MD, [facility]       /EPI

## 2019-09-07 NOTE — CON
[address]  [address]                    CONSULTATION                  
_______________________________________________________________________________
 
Name:       LARA RAO         Room:           [address] [place]  
M.R.#:  [id number]      Account #:      [id number]  
Admission:  09/02/19     Attend Phys:    Rafia Bermeo MD 
Discharge:  09/04/19     Date of Birth:  [birth year]  
         Report #: [id number]
                                                                     [id number]  
_______________________________________________________________________________
THIS REPORT FOR:  //name//                      
 
CC: Aviva Bermeo MD
 
DATE OF SERVICE:  09/02/2019
 
 
REFERRING PHYSICIAN:  Rafia Bermeo MD
 
REASON FOR CONSULTATION:  Overt hematemesis.
 
IMPRESSION:
1.  Overt hematemesis with associated anemia and known history of esophageal
varices - suspect problems related to the same.
2.  Alcoholic cirrhosis with history of grade 1 esophageal varices noted in
January of this year.
3.  Previous history of alcohol abuse with the patient being sober for over a
year.
4.  Status post recent cholecystectomy 2 weeks ago for symptomatic biliary tract
disease.
 
RECOMMENDATIONS:
1.  We will proceed with upper endoscopy today.  I suspect we will need to do
variceal banding as well.
2.  We will continue both the octreotide and Protonix at this time until we have
more information.
3.  I have discussed the plans with the patient as well and she is agreeable to
the same.
 
HISTORY OF PRESENT ILLNESS:  The patient is a pleasant 44-year-old white female
well known to me with history of decompensated liver disease secondary to
alcoholic cirrhosis with history of ascites related to the same.  She was
admitted to the hospital because of problems with overt hematemesis.  She states
she had been eating and not having any issues at all, but started feeling
nauseous and started throwing up blood yesterday.  She denies any complaints of
any dysphagia, odynophagia, postprandial pain or any problems with her bowels or
bowel frequency.  She denies any black stools or tarry stools.  She became
symptomatic with the same and came to the hospital.  She was hospitalized
earlier this year at which time she underwent upper endoscopy in January, which
revealed grade 1 esophageal varices.  She has been abstinent from alcohol for
over a year and has done very well.  She has no desire to restart.  She has not
been taking nonsteroidals or any other issues.  She is admitted to the hospital
for further evaluation and treatment.
 
 
 
 
[place] [address]                    CONSULTATION                  
_______________________________________________________________________________
 
Name:       LARA RAO         Room:           [address]#:  [id number]      Account #:      [id number]  
Admission:  09/02/19     Attend Phys:    Rafia Bermeo MD 
Discharge:  09/04/19     Date of Birth:  [birth year]  
         Report #: [id number]
                                                                     [id number]  
_______________________________________________________________________________
ALLERGIES:  None.
 
MEDICATIONS:  At home include propranolol 10 mg once daily, spironolactone 50 mg
once daily, Lasix 20 mg once daily, ferrous sulfate 325 mg twice daily,
magnesium oxide 400 mg up to twice daily, vitamin B12 2500 mcg once daily and
potassium.
 
PAST MEDICAL AND SURGICAL HISTORY:  Remarkable for decompensated liver disease
secondary to alcoholic cirrhosis with associated ascites.  This is the first
variceal bleed if this is what it is.  She has a history of hypertension.  She
had previous appendectomy, cholecystectomy, breast augmentation.  She has a
history of alcoholic pancreatitis as well.  She has had previous blood
transfusions related to anemia as well.
 
SOCIAL HISTORY:  She does not smoke, does not do any drugs and quit drinking
about a year to year and a half ago.
 
PHYSICAL EXAMINATION:
GENERAL:  Revealed pleasant 44-year-old white female who is awake and alert.
CARDIOPULMONARY:  Revealed a tachycardic rate and rhythm.
LUNGS:  Clear.
ABDOMEN:  Soft and not tender.  No rebound or guarding noted.
 
LABORATORY DATA:  From admission revealed a white count 11.8, hemoglobin 9.7,
platelet count 165,000, MCV is 94 and RDW is 14.  Her hemoglobin this morning
went down to 8.5.  Her sodium on admission 145, potassium 3.8, chloride 111,
bicarbonate 25, BUN 36, and creatinine 0.8.  Her total bilirubin 0.6, alkaline
phosphatase 115, AST 17, ALT 24.  Her albumin is 2.7.
 
DISCUSSION:  At the present time, the patient has overt hematemesis.  I suspect
that this is related to variceal bleeding.  We will proceed with upper endoscopy
today and make further recommendations thereafter.
 
 
 
 
 
 
 
 
 
 
 
 
<ELECTRONICALLY SIGNED>
                                        By:  Tong Jimenez DO          
09/07/19     1606
D: 09/03/19 0905_______________________________________
T: 09/03/19 0923Tong Jimenez DO             /nt

## 2020-02-17 NOTE — CON
[address]  [address]                    CONSULTATION                  
_______________________________________________________________________________
 
Name:       LARA RAO KING         Room:           [address] [place]  
M.R.#:  [id number]      Account #:      [id number]  
Admission:  02/17/20     Attend Phys:    Franko Garcia MD 
Discharge:  02/21/20     Date of Birth:  [birth year]  
         Report #: [id number]
                                                                     [id number]  
_______________________________________________________________________________
THIS REPORT FOR:  //name//                      
 
cc:  Aviva Patel Anna S. DO                                                    ~
THIS REPORT FOR:  //name//                      
 
CC: Franko Patel
 
DATE OF SERVICE:  02/17/2020
 
 
REFERRING PHYSICIAN:  Aviva Patel DO
 
REASON FOR CONSULTATION:  Rectal bleeding.
 
IMPRESSION:
1.  Rectal bleeding with associated hematemesis - evaluate for esophageal
varices.
2.  Decompensated liver disease secondary to previous alcohol abuse with the
patient being abstinent for quite some time.
3.  Mild ascites, well controlled on current diuretics.
 
RECOMMENDATIONS:
1.  Since the patient has had some overt hematemesis and hypotension, we will
proceed with emergent upper endoscopy tonight.  I have discussed these plans
with the patient as well and she is agreeable to the same.
2.  We will continue with Protonix and octreotide drips at this time and make
further recommendations after endoscopy is performed.  I have discussed the
plans with the patient as well and she is agreeable to the same.
 
HISTORY OF PRESENT ILLNESS:  The patient is a very pleasant 44-year-old white
female with history of decompensated liver disease secondary to previous alcohol
abuse, who was admitted to the hospital with complaints of hematochezia followed
by eventually hematemesis with quite a bit of blood being thrown up.  She states
she is doing well otherwise.  She is followed by me on a regular basis with last
office visit being within the last month or two.  She was intolerant of
propranolol and has had esophageal variceal banding last of which was performed
late last year.  She denies any complaints of severe dysphagia, odynophagia,
postprandial pain, or any problems normally with her bowels or bowel frequency. 
She is admitted to the hospital for further evaluation and treatment.
 
ALLERGIES:  None.
 
MEDICATIONS:  Spironolactone 50 mg once daily, furosemide 20 mg once daily,
FerraPlus 1 tablet daily, magnesium oxide 400 mg twice daily and vitamin B12 of
 
 
 
[place] [address]                    CONSULTATION                  
_______________________________________________________________________________
 
Name:       LARA RAO         Room:           [address]#:  [id number]      Account #:      [id number]  
Admission:  02/17/20     Attend Phys:    Franko Garcia MD 
Discharge:  02/21/20     Date of Birth:  [birth year]  
         Report #: [id number]
                                                                     [id number]  
_______________________________________________________________________________
2500 mcg once daily.
 
PAST MEDICAL HISTORY:  Remarkable for decompensated liver disease secondary to
previous alcohol abuse with the patient being abstinent for over 2 years.  She
has a history of hypertension.  She has had history of pancreatitis, anemia. 
She had previous cholecystectomy as well in the past.
 
SOCIAL HISTORY:  The patient quit drinking, does not smoke.
 
FAMILY HISTORY:  Negative.
 
PHYSICAL EXAMINATION:
GENERAL:  Pleasant 44-year-old white female who is awake and alert.  She is
tachycardic.
LUNGS:  Clear.
ABDOMEN:  Soft and not tender, does not appear to be in any obvious ascites.
 
LABORATORY DATA:  From admission revealed a white count of 11.4, hemoglobin
10.1, platelet count 176,000.  At 2119 hours; however, her hemoglobin dropped
down to 9.2.  Her sodium 143, potassium 4.3, chloride 109, bicarbonate is 24,
BUN 38, creatinine 0.7, total bilirubin 0.6, alkaline phosphatase 110, AST 25,
ALT 30, albumin is 3.1.
 
DISCUSSION:  At the present time, the patient has had some problems with
recurrent bleeding.  We will proceed with upper endoscopy today and make further
recommendations thereafter.
 
 
 
 
 
 
 
 
 
 
 
 
 
 
 
 
 
 
                       
                                        By:                                
                 
D: 02/24/20 1813_______________________________________
T: 02/24/20 Annabel Jimenez DO             /nt

## 2020-02-18 NOTE — EKG
[place] [address]
Phone:  [phone]                     ELECTROCARDIOGRAM REPORT      
_______________________________________________________________________________
 
Name:         LARA RAO        Room:          [address]    ADM [place] 
.R.#:    [id number]     Account #:     [id number]  
Admission:    20    Attend Phys:   Franko Garcia, 
Discharge:                Date of Birth: [birth year]  
Date of Service: 20 1732  Report #:      [id number]
        [id number]
_______________________________________________________________________________
THIS REPORT FOR:  //name//                      
 
                         [facility] ED
                                       
Test Date:    2020               Test Time:    17:32:19
Pat Name:     LARA RAO         Department:   
Patient ID:   [id number]            Room:         [facility]
Gender:       F                        Technician:   
:          1975               Requested By: Mady Kinsey
Order Number: [id number] MD:   Gustavo Mcdaniel
                                 Measurements
Intervals                              Axis          
Rate:         76                       P:            12
KS:           133                      QRS:          -9
QRSD:         83                       T:            41
QT:           383                                    
QTc:          431                                    
                           Interpretive Statements
Sinus rhythm
Inferior infarct, old
Consider anterior infarct
Compared to ECG 2019 02:17:02
Myocardial infarct finding now present
Sinus tachycardia no longer present
ST (T wave) deviation no longer present
 
Electronically Signed On 2020 11:34:57 CST by Gustavo Mcdaniel
[email or web address]
 
 
 
 
 
 
 
 
 
 
 
 
 
 
 
 
  <ELECTRONICALLY SIGNED>
                                           By: Gustavo Mcdaniel MD, FAC     
  20     1134
D: 20 1732   _____________________________________
T: 20 1732   Gustavo Mcdaniel MD, [facility]       /EPI

## 2020-02-24 NOTE — EKG
[place] [address]
Phone:  [phone]                     ELECTROCARDIOGRAM REPORT      
_______________________________________________________________________________
 
Name:         MAIRALARA CANELA        Room:                     [facility] 
M.R.#:    [id number]     Account #:     [id number]  
Admission:    20    Attend Phys:                     
Discharge:                Date of Birth: [birth year]  
Date of Service: 20 1327  Report #:      [id number]
        [id number]
_______________________________________________________________________________
THIS REPORT FOR:  //name//                      
 
                         [facility] ED
                                       
Test Date:    2020               Test Time:    13:27:40
Pat Name:     LARA RAO         Department:   
Patient ID:   [id number]            Room:          
Gender:                               Technician:   RITESH
:          1975               Requested By: Corina Jiang
Order Number: [id number] MD:   Cesar Valverde
                                 Measurements
Intervals                              Axis          
Rate:         66                       P:            5
CT:           133                      QRS:          -3
QRSD:         89                       T:            50
QT:           413                                    
QTc:          433                                    
                           Interpretive Statements
Sinus rhythm
poor r wave progression
Compared to ECG 2020 17:32:19
no change
 
Electronically Signed On 2020 15:34:52 CST by Cesar Valverde
[email or web address]
 
 
 
 
 
 
 
 
 
 
 
 
 
 
 
 
 
 
 
  <ELECTRONICALLY SIGNED>
                                           By: Cesar Valverde MD, [facility]      
  20     1534
D: 20 1327   _____________________________________
T: 20 1327   Cesar Valverde MD, [facility]        /EPI

## 2020-02-24 NOTE — CON
[address]  [address]                    CONSULTATION                  
_______________________________________________________________________________
 
Name:       LARA RAO         Room:           [address] [place]  
.R.#:  [id number]      Account #:      [id number]  
Admission:  02/24/20     Attend Phys:    BULMARO Powell
Discharge:               Date of Birth:  [birth year]  
         Report #: [id number]
                                                                     [id number]  
_______________________________________________________________________________
THIS REPORT FOR:  //name//                      
 
cc:  Aviva Patel Anna S. DO                                                    
THIS REPORT FOR:  //name//                      
 
CC: Aviva Ware
 
DICTATED BY: [facility]
 
Please note at the time of this dictation, the patient was seen and physically
examined by myself.
 
ALLERGIES:  No known drug allergies.
 
MEDICATIONS:  From home, Protonix, Carafate and iron supplement, her
spironolactone and furosemide.
 
HISTORY OF PRESENT ILLNESS:  This is a 44-year-old female who presented to the
Emergency Room with yesterday morning she ate a biscuit, and she subsequently
felt this discomfort in her throat, pain, which then subsided several hours
later.  She passed a very large black to bright red bloody clots via rectum that
was all that she passed.  Given this prompted her to come in to be further
evaluated.  She said it was like a 2-3 inch diameter clot that she passed.  She
denies any nausea, vomiting or any hematemesis.  She denies any abdominal pain
and no further hematochezia has been noted since admission.  The patient
underwent an EGD back on 02/17/2020 for overt hematemesis and melena.  She was
found to have 4 esophageal varices that were banded by Dr. Jimenez at that time
on the 17th.  She was placed on Protonix and octreotide and was doing very well.
 Hemoglobin had responded nicely, and she was sent home on Friday with her
prescriptions.  She states she was doing very well since that time.  She was
planning on having a repeat EGD in about 4 weeks to assess her banding.  Please
note at the time of this dictation, the patient was seen and physically examined
by myself.
 
PAST MEDICAL HISTORY:  Hypertension, anxiety, history of pancreatitis, fatty
liver cirrhosis, decompensated liver disease, esophageal varices, anemia.
 
PAST SURGICAL HISTORY:  Appendectomy, tonsillectomy, breast augmentation,
cholecystectomy.
 
FAMILY HISTORY:  Noncontributory.
 
SOCIAL HISTORY:  Past use of alcohol.  Denies any tobacco or illegal drug use.
 
 
 
[place] [address]                    CONSULTATION                  
_______________________________________________________________________________
 
Name:       LARA RAO         Room:           [address]#:  [id number]      Account #:      [id number]  
Admission:  02/24/20     Attend Phys:    BULMARO Powell
Discharge:               Date of Birth:  [birth year]  
         Report #: [id number]
                                                                     [id number]  
_______________________________________________________________________________
 
REVIEW OF SYSTEMS:  Twelve-point review of systems is essentially negative
except what is mentioned in the HPI.
 
PHYSICAL EXAMINATION:
VITAL SIGNS:  Temperature 37, pulse 77, respirations 21, blood pressure 106/58.
HEART:  Regular rate and rhythm.
LUNGS:  Clear.
ABDOMEN:  Soft, positive bowel sounds in all 4 quadrants with no masses or
tenderness noted.
 
LABORATORY DATA:  Hemoglobin on the date of discharge on 02/21/2020 was 8.2, on
admission she was 7.7, this morning she is 6.4, white count is 4, platelets is
57.  GFR is 108, BUN is 14 and creatinine is 0.6.
 
IMPRESSION:
1.  Rectal bleeding.
2.  Acute on chronic anemia.
3.  Esophageal varices banding on 02/17/2020 x 4.
4.  Decompensated liver disease secondary to alcohol abuse.
5.  Ascites, mild.
6.  Thrombocytopenia.
 
PLAN:
1.  Continue Protonix and octreotide drip.
2.  Monitor H and H, transfuse greater than 7.
3.  Consider EGD with Dr. Oleary, the timing of this to be determined.
4.  Further recommendations to be made once Dr. Oleary sees the patient later
today.
 
Thank you for allowing us to participate in this patient's care.  Please do not
hesitate to call with any questions in regard to this consult.
 
 
 
 
 
 
 
 
 
 
 
 
                       
                                        By:                                
                 
D: 02/25/20 0946_______________________________________
T: 02/25/20 1047César Oleary MD               /nt

## 2020-04-08 NOTE — EKG
[place] [address]
Phone:  [phone]                     ELECTROCARDIOGRAM REPORT      
_______________________________________________________________________________
 
Name:         LARA RAO            Room:          [address]    ADM [place] 
M.R.#:    [id number]     Account #:     [id number]  
Admission:    20    Attend Phys:   Franko Garcia, 
Discharge:                Date of Birth: [birth year]  
Date of Service: 20  Report #:      [id number]
        [id number]
_______________________________________________________________________________
THIS REPORT FOR:  //name//                      
 
                         [facility] ED
                                       
Test Date:    2020               Test Time:    20:45:43
Pat Name:     LARA RAO         Department:   
Patient ID:   [id number]            Room:         [facility]
Gender:       F                        Technician:   FL
:          1975               Requested By: Chapis Charles
Order Number: [id number] MD:   Cesar Valverde
                                 Measurements
Intervals                              Axis          
Rate:         109                      P:            19
VA:           121                      QRS:          -1
QRSD:         82                       T:            43
QT:           326                                    
QTc:          440                                    
                           Interpretive Statements
Sinus tachycardia
poor r wave progression
Minimal ST depression, lateral leads
Compared to ECG 2020 13:27:40
ST (T wave) deviation now present
Sinus rhythm no longer present
 
 
Electronically Signed On 2020 10:45:39 CDT by Cesar Valverde
[email or web address]
 
 
 
 
 
 
 
 
 
 
 
 
 
 
 
 
  <ELECTRONICALLY SIGNED>
                                           By: Cesar Valverde MD, FACC      
  20     1045
D: 20   _____________________________________
T: 20   Cesar Valverde MD, FAC        /EPI

## 2020-04-24 NOTE — CON
[address]  [address]                    CONSULTATION                  
_______________________________________________________________________________
 
Name:       LARA RAO             Room:           [address] [place]  
M.R.#:  [id number]      Account #:      [id number]  
Admission:  04/07/20     Attend Phys:    Franko Garcia MD 
Discharge:  04/10/20     Date of Birth:  [birth year]  
         Report #: [id number]
                                                                     [id number]  
_______________________________________________________________________________
THIS REPORT FOR:  //name//                      
 
cc:  Aviva Patel Anna S. DO                                                    
THIS REPORT FOR:  //name//                      
 
CC: Franko Patel
 
DATE OF SERVICE:  04/08/2020
 
 
REASON FOR CONSULT:  Hematemesis.
 
HISTORY OF PRESENT ILLNESS:  This is a 44-year-old female who is well known to
us as she has been in the hospital multiple times and she is known to have
alcoholic cirrhosis.  She has had upper endoscopy in 02/2019 with banding of the
distal esophageal varices.  The patient reports that she was doing well until
yesterday when she started getting sick to her stomach and started vomiting
blood.  This prompted her to come to the hospital.  Her hemoglobin was 9.2 on
admission and trended down.  The patient was started on octreotide and Protonix
in the ER and serial blood work were obtained to assure that she does not drop
below 7.
 
The patient denies any abdominal pain, fever, chills, diarrhea, constipation or
hematochezia.
 
PAST MEDICAL HISTORY:  Significant for history of alcoholic cirrhosis.  The
patient also has history of pancreatitis, hypertension, gallbladder disease,
status post cholecystectomy, gastritis, chronic anemia, pancreatitis, breast
augmentation, appendectomy and tonsillectomy.
 
ALLERGIES:  No known drug allergy.
 
MEDICATIONS:  Please refer to MAR.
 
SOCIAL HISTORY:  The patient has history of alcoholic cirrhosis.  She has past
use of alcohol, but denies tobacco or alcohol use at this time.  She has not had
any ethanol use for the past couple of years.
 
FAMILY HISTORY:  Noncontributory.
 
PHYSICAL EXAMINATION:
VITAL SIGNS:  Reveals blood pressure of 97/50, respirations 11, pulse 114,
temperature 98.2.
LUNGS:  Clear.
 
 
 
[place] [address]                    CONSULTATION                  
_______________________________________________________________________________
 
Name:       LARA RAO             Room:           [address]#:  [id number]      Account #:      [id number]  
Admission:  04/07/20     Attend Phys:    Franko Garcia MD 
Discharge:  04/10/20     Date of Birth:  [birth year]  
         Report #: [id number]
                                                                     [id number]  
_______________________________________________________________________________
CARDIOVASCULAR:  Regular.
ABDOMEN:  Soft, nontender, nondistended.  Bowel sounds are positive.
NEUROLOGIC:  The patient is alert and oriented x 3.
 
LABORATORY DATA:  Reveal sodium of 139, potassium 3.7, BUN is 21, creatinine
0.7, AST is 23, ALT 26, magnesium is 1.2.  Total bilirubin is 0.5.  WBC is 9.0,
hemoglobin is 7.5, down from 9.2 at admission.  Platelet is 143.
 
IMAGING:  None obtained in this admission.
 
ASSESSMENT AND PLAN:  The patient with history of alcoholic cirrhosis and portal
hypertension, who is known to have esophageal varices and presents with
hematemesis.  She also has dropped her hemoglobin by a couple grams.  We will
monitor her H and H and transfuse if she drops below 7.  Meanwhile, we will
perform an emergent upper endoscopy to further evaluate her upper GI bleed and
varices.  She also should receive IV antibiotics due to her esophageal bleed.
 
 
 
 
 
 
 
 
 
 
 
 
 
 
 
 
 
 
 
 
 
 
 
 
 
 
 
 
<ELECTRONICALLY SIGNED>
                                        By:  César Oleary MD            
04/24/20     1109
D: 04/08/20 1453_______________________________________
T: 04/08/20 1528Farjenna Oleary MD               /nt